# Patient Record
Sex: MALE | Race: WHITE | Employment: FULL TIME | URBAN - METROPOLITAN AREA
[De-identification: names, ages, dates, MRNs, and addresses within clinical notes are randomized per-mention and may not be internally consistent; named-entity substitution may affect disease eponyms.]

---

## 2019-02-14 ENCOUNTER — OFFICE VISIT (OUTPATIENT)
Dept: FAMILY MEDICINE CLINIC | Age: 49
End: 2019-02-14
Payer: MEDICAID

## 2019-02-14 VITALS
HEIGHT: 69 IN | DIASTOLIC BLOOD PRESSURE: 83 MMHG | BODY MASS INDEX: 29.27 KG/M2 | TEMPERATURE: 97 F | WEIGHT: 197.6 LBS | HEART RATE: 85 BPM | SYSTOLIC BLOOD PRESSURE: 136 MMHG

## 2019-02-14 DIAGNOSIS — G89.29 CHRONIC PAIN OF LEFT ANKLE: Primary | ICD-10-CM

## 2019-02-14 DIAGNOSIS — M25.572 CHRONIC PAIN OF LEFT ANKLE: Primary | ICD-10-CM

## 2019-02-14 DIAGNOSIS — Z00.00 HEALTHCARE MAINTENANCE: ICD-10-CM

## 2019-02-14 DIAGNOSIS — Z23 NEED FOR SHINGLES VACCINE: ICD-10-CM

## 2019-02-14 PROCEDURE — 99211 OFF/OP EST MAY X REQ PHY/QHP: CPT | Performed by: FAMILY MEDICINE

## 2019-02-14 PROCEDURE — G8419 CALC BMI OUT NRM PARAM NOF/U: HCPCS | Performed by: FAMILY MEDICINE

## 2019-02-14 PROCEDURE — 1036F TOBACCO NON-USER: CPT | Performed by: FAMILY MEDICINE

## 2019-02-14 PROCEDURE — 99213 OFFICE O/P EST LOW 20 MIN: CPT | Performed by: FAMILY MEDICINE

## 2019-02-14 PROCEDURE — G8427 DOCREV CUR MEDS BY ELIG CLIN: HCPCS | Performed by: FAMILY MEDICINE

## 2019-02-14 PROCEDURE — G8484 FLU IMMUNIZE NO ADMIN: HCPCS | Performed by: FAMILY MEDICINE

## 2019-02-14 ASSESSMENT — PATIENT HEALTH QUESTIONNAIRE - PHQ9
2. FEELING DOWN, DEPRESSED OR HOPELESS: 0
SUM OF ALL RESPONSES TO PHQ QUESTIONS 1-9: 0
SUM OF ALL RESPONSES TO PHQ QUESTIONS 1-9: 0
SUM OF ALL RESPONSES TO PHQ9 QUESTIONS 1 & 2: 0
1. LITTLE INTEREST OR PLEASURE IN DOING THINGS: 0

## 2019-03-08 ENCOUNTER — HOSPITAL ENCOUNTER (OUTPATIENT)
Age: 49
Discharge: HOME | End: 2019-03-08
Payer: MEDICAID

## 2019-03-08 ENCOUNTER — HOSPITAL ENCOUNTER (OUTPATIENT)
Dept: GENERAL RADIOLOGY | Age: 49
Discharge: HOME | End: 2019-03-10
Payer: MEDICAID

## 2019-03-08 ENCOUNTER — HOSPITAL ENCOUNTER (OUTPATIENT)
Age: 49
Discharge: HOME | End: 2019-03-10
Payer: MEDICAID

## 2019-03-08 DIAGNOSIS — Z00.00 HEALTHCARE MAINTENANCE: ICD-10-CM

## 2019-03-08 DIAGNOSIS — G89.29 CHRONIC PAIN OF LEFT ANKLE: ICD-10-CM

## 2019-03-08 DIAGNOSIS — M25.572 CHRONIC PAIN OF LEFT ANKLE: ICD-10-CM

## 2019-03-08 LAB
ANION GAP SERPL CALCULATED.3IONS-SCNC: 11 MMOL/L (ref 9–17)
BUN BLDV-MCNC: 14 MG/DL (ref 6–20)
BUN/CREAT BLD: ABNORMAL (ref 9–20)
CALCIUM SERPL-MCNC: 9.3 MG/DL (ref 8.6–10.4)
CHLORIDE BLD-SCNC: 105 MMOL/L (ref 98–107)
CHOLESTEROL/HDL RATIO: 4.6
CHOLESTEROL: 209 MG/DL
CO2: 26 MMOL/L (ref 20–31)
CREAT SERPL-MCNC: 0.79 MG/DL (ref 0.7–1.2)
GFR AFRICAN AMERICAN: >60 ML/MIN
GFR NON-AFRICAN AMERICAN: >60 ML/MIN
GFR SERPL CREATININE-BSD FRML MDRD: ABNORMAL ML/MIN/{1.73_M2}
GFR SERPL CREATININE-BSD FRML MDRD: ABNORMAL ML/MIN/{1.73_M2}
GLUCOSE BLD-MCNC: 120 MG/DL (ref 70–99)
HDLC SERPL-MCNC: 45 MG/DL
LDL CHOLESTEROL: 128 MG/DL (ref 0–130)
POTASSIUM SERPL-SCNC: 4.3 MMOL/L (ref 3.7–5.3)
SODIUM BLD-SCNC: 142 MMOL/L (ref 135–144)
TRIGL SERPL-MCNC: 182 MG/DL
VLDLC SERPL CALC-MCNC: ABNORMAL MG/DL (ref 1–30)

## 2019-03-08 PROCEDURE — 36415 COLL VENOUS BLD VENIPUNCTURE: CPT

## 2019-03-08 PROCEDURE — 80061 LIPID PANEL: CPT

## 2019-03-08 PROCEDURE — 80048 BASIC METABOLIC PNL TOTAL CA: CPT

## 2019-03-08 PROCEDURE — 73610 X-RAY EXAM OF ANKLE: CPT

## 2019-03-12 ENCOUNTER — OFFICE VISIT (OUTPATIENT)
Dept: FAMILY MEDICINE CLINIC | Age: 49
End: 2019-03-12
Payer: MEDICAID

## 2019-03-12 VITALS
DIASTOLIC BLOOD PRESSURE: 86 MMHG | HEART RATE: 79 BPM | HEIGHT: 69 IN | TEMPERATURE: 98.6 F | SYSTOLIC BLOOD PRESSURE: 138 MMHG | WEIGHT: 196 LBS | BODY MASS INDEX: 29.03 KG/M2

## 2019-03-12 DIAGNOSIS — R20.2 PARESTHESIAS: ICD-10-CM

## 2019-03-12 DIAGNOSIS — M19.079 ANKLE ARTHRITIS: Primary | ICD-10-CM

## 2019-03-12 DIAGNOSIS — R73.9 HYPERGLYCEMIA: ICD-10-CM

## 2019-03-12 PROCEDURE — G8419 CALC BMI OUT NRM PARAM NOF/U: HCPCS | Performed by: FAMILY MEDICINE

## 2019-03-12 PROCEDURE — 99213 OFFICE O/P EST LOW 20 MIN: CPT | Performed by: FAMILY MEDICINE

## 2019-03-12 PROCEDURE — 99211 OFF/OP EST MAY X REQ PHY/QHP: CPT | Performed by: FAMILY MEDICINE

## 2019-03-12 PROCEDURE — G8484 FLU IMMUNIZE NO ADMIN: HCPCS | Performed by: FAMILY MEDICINE

## 2019-03-12 PROCEDURE — G8427 DOCREV CUR MEDS BY ELIG CLIN: HCPCS | Performed by: FAMILY MEDICINE

## 2019-03-12 PROCEDURE — 1036F TOBACCO NON-USER: CPT | Performed by: FAMILY MEDICINE

## 2019-03-26 ENCOUNTER — TELEPHONE (OUTPATIENT)
Dept: FAMILY MEDICINE CLINIC | Age: 49
End: 2019-03-26

## 2019-03-27 ENCOUNTER — OFFICE VISIT (OUTPATIENT)
Dept: ORTHOPEDIC SURGERY | Age: 49
End: 2019-03-27
Payer: MEDICAID

## 2019-03-27 VITALS — HEIGHT: 69 IN | BODY MASS INDEX: 29.03 KG/M2 | WEIGHT: 195.99 LBS

## 2019-03-27 DIAGNOSIS — M19.072 ARTHRITIS OF LEFT ANKLE: Primary | ICD-10-CM

## 2019-03-27 PROCEDURE — 99243 OFF/OP CNSLTJ NEW/EST LOW 30: CPT | Performed by: ORTHOPAEDIC SURGERY

## 2019-03-27 PROCEDURE — G8419 CALC BMI OUT NRM PARAM NOF/U: HCPCS | Performed by: ORTHOPAEDIC SURGERY

## 2019-03-27 PROCEDURE — 3017F COLORECTAL CA SCREEN DOC REV: CPT | Performed by: ORTHOPAEDIC SURGERY

## 2019-03-27 PROCEDURE — G8484 FLU IMMUNIZE NO ADMIN: HCPCS | Performed by: ORTHOPAEDIC SURGERY

## 2019-03-27 PROCEDURE — G8427 DOCREV CUR MEDS BY ELIG CLIN: HCPCS | Performed by: ORTHOPAEDIC SURGERY

## 2019-03-27 ASSESSMENT — ENCOUNTER SYMPTOMS
WHEEZING: 0
BACK PAIN: 0
SHORTNESS OF BREATH: 0

## 2019-03-28 RX ORDER — MELOXICAM 15 MG/1
15 TABLET ORAL DAILY
Qty: 30 TABLET | Refills: 2 | Status: SHIPPED | OUTPATIENT
Start: 2019-03-28 | End: 2019-06-27

## 2019-04-01 DIAGNOSIS — M25.572 LEFT ANKLE PAIN, UNSPECIFIED CHRONICITY: Primary | ICD-10-CM

## 2019-04-02 ENCOUNTER — OFFICE VISIT (OUTPATIENT)
Dept: ORTHOPEDIC SURGERY | Age: 49
End: 2019-04-02
Payer: MEDICAID

## 2019-04-02 VITALS — WEIGHT: 195 LBS | BODY MASS INDEX: 28.88 KG/M2 | HEIGHT: 69 IN

## 2019-04-02 DIAGNOSIS — M19.172 POST-TRAUMATIC ARTHRITIS OF LEFT ANKLE: Primary | ICD-10-CM

## 2019-04-02 PROCEDURE — 99213 OFFICE O/P EST LOW 20 MIN: CPT | Performed by: ORTHOPAEDIC SURGERY

## 2019-04-02 PROCEDURE — 3017F COLORECTAL CA SCREEN DOC REV: CPT | Performed by: ORTHOPAEDIC SURGERY

## 2019-04-02 PROCEDURE — G8427 DOCREV CUR MEDS BY ELIG CLIN: HCPCS | Performed by: ORTHOPAEDIC SURGERY

## 2019-04-02 PROCEDURE — G8419 CALC BMI OUT NRM PARAM NOF/U: HCPCS | Performed by: ORTHOPAEDIC SURGERY

## 2019-04-02 PROCEDURE — 1036F TOBACCO NON-USER: CPT | Performed by: ORTHOPAEDIC SURGERY

## 2019-04-02 ASSESSMENT — ENCOUNTER SYMPTOMS
EYE PAIN: 0
SHORTNESS OF BREATH: 0
ABDOMINAL PAIN: 0

## 2019-04-02 NOTE — LETTER
Dr. Reynold Willett  [address] [place] [address] 6 [address]  [phone]        4/2/2019     Patient: Andree Villa  YOB: 1970    Dear Wesley Britt MD,    I had the pleasure of seeing one of your patients, Andree Villa recently in the office. Below are the relevant portions of my assessment and plan of care. ASSESSMENT AND PLAN:     He has left posttraumatic ankle arthritis, secondary to a tibial plafond fracture in 1996, status post bone grafting (no internal fixation used). Notably, he does not have any relevant past medical issues. I had a long discussion today with the patient about the likely diagnosis and its natural history, physical exam and imaging findings, as well as treatment options. We discussed both surgical and nonsurgical treatments, including risks and benefits. From a nonoperative standpoint, we discussed rest/activity modification, weight loss,  NSAIDs/Acetaminophen/topical anesthetics, alternative shoe wear/modification, orthotics, bracing/immobilization, physical therapy, injections, and possible future/advanced imaging. We also discussed intermittent ice (15-20 minutes every  minutes while awake), and elevation (about 3-4\" above the heart), to help with swelling and pain, as well as compression socks. From a surgical standpoint, we discussed tibiotalar arthrodesis as well as total ankle replacement, with possible ANANT versus gastroc, and soft tissue balancing as needed. We also discussed that they both provide roughly equivalent levels of pain relief as well as postoperative activity. Given his relatively young age, as well as the posttraumatic nature of his degeneration, I have recommended a tibiotalar arthrodesis, in the future whenever he fails nonoperative treatment. At this point, the patient wishes to pursue conservative management for this problem. The patient will proceed with the recommendations as above. Orders/referrals were placed as below at today's visit. I provided him a referral to obtain an ultrasound-guided injection of his left ankle. I also provided him compression socks (20-30 mmHg). I also provided a referral to obtain an [place] brace. All questions were answered and the patient agrees with the above plan. The patient will return to clinic in 6 weeks with standing bone length x-rays to evaluate his alignment. I look forward to serving you and your patients again in the future. Please don't hesitate to contact me at my mobile number [phone].         Travis Foote MD  Orthopedic Surgery, Foot and Ankle

## 2019-04-02 NOTE — PROGRESS NOTES
meloxicam (MOBIC) 15 MG tablet Take 1 tablet by mouth daily 30 tablet 2     No current facility-administered medications for this visit. Allergies:    Patient has no known allergies. Family History:  Family History   Problem Relation Age of Onset    Cancer Mother     Heart Disease Father     No Known Problems Sister     No Known Problems Brother     No Known Problems Maternal Aunt     No Known Problems Maternal Uncle     No Known Problems Maternal Grandmother     Heart Attack Maternal Grandfather     No Known Problems Paternal Grandmother     No Known Problems Paternal Grandfather        Social History:   Social History     Socioeconomic History    Marital status: Single     Spouse name: Not on file    Number of children: Not on file    Years of education: Not on file    Highest education level: Not on file   Occupational History    Not on file   Social Needs    Financial resource strain: Not on file    Food insecurity:     Worry: Not on file     Inability: Not on file    Transportation needs:     Medical: Not on file     Non-medical: Not on file   Tobacco Use    Smoking status: Never Smoker    Smokeless tobacco: Never Used   Substance and Sexual Activity    Alcohol use:  Yes     Alcohol/week: 3.6 oz     Types: 6 Cans of beer per week     Comment: 6 pack of beer a week    Drug use: Yes     Types: Marijuana    Sexual activity: Not on file   Lifestyle    Physical activity:     Days per week: Not on file     Minutes per session: Not on file    Stress: Not on file   Relationships    Social connections:     Talks on phone: Not on file     Gets together: Not on file     Attends Orthodoxy service: Not on file     Active member of club or organization: Not on file     Attends meetings of clubs or organizations: Not on file     Relationship status: Not on file    Intimate partner violence:     Fear of current or ex partner: Not on file     Emotionally abused: Not on file     Physically abused: Not on file     Forced sexual activity: Not on file   Other Topics Concern    Not on file   Social History Narrative    Not on file     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 Skin: Intact without rash/lesions/AV malformations. Healed incision over the anterior ankle with diminished sensation directly over this. Strength: Able to fire/perform the following with appropriate strength:    [x]  Tib Ant:     [x]  Gastroc-Soleus:         [x]  Inversion:    [x]  Eversion:         [x]  FHL:     [x]  EHL:      Motion:  Normal for the following joints:     []  Ankle: decreased   []  Subtalar:   Diminished bilateral but slightly less on the left than the contralateral side     [x]  1st MTP:        []  1st TMT:           Pain with extremes of ankle ROM    Tenderness to Palpation:    Tenderness to palpation:  Anterior ankle joint line  No tenderness to palpation:  Sinus tarsi    Muscle Contractures:       []  Gastroc Equinus       []  Achilles       []  PTT       []  Peroneus Longus     [x]  Unable to assess due to decreased ROM    Instability: WNL/negative tests below:      [x]  Varus stress laxity      [x]  Valgus stress laxity      UNAFFECTED EXTREMITY (right lower extremity):  Alignment:  Heel neutral  Vascular: Toes warm and well perfused, compartments soft/compressible, no swelling of ankle.   Skin: Intact without rash/lesions/AV malformations. Strength: Able to fire/perform the following with appropriate strength:    [x]  Tib Ant:     [x]  Gastroc-Soleus:         [x]  Inversion:    [x]  Eversion:         [x]  FHL:     [x]  EHL:      Motion:  Normal for the following joints:     [x]  Ankle:     []  Subtalar:    Diminished    [x]  1st MTP:        []  1st TMT:            Tenderness to Palpation:    No tenderness to palpation:  bony prominences or joints    Muscle Contractures:       []  Gastroc Equinus       []  Achilles       []  PTT       []  Peroneus Longus     []  Unable to assess due to decreased ROM    Instability: WNL/negative tests below:      [x]  Varus stress laxity      [x]  Valgus stress laxity        RADIOLOGY:   4/2/2019 FINDINGS:  Three weightbearing views (AP, Mortise, and Lateral) of the left ankle and three weightbearing views (AP, Oblique, Lateral) of the left foot were obtained in the office today and reviewed, revealing no acute fracture, dislocation, or radioopaque foreign body/tumor. The ankle mortise is maintained with no widening of the clear spaces. Overall alignment is satisfactory. Also noted:  osteophytes of the dorsal neck of the talus and anterior distal tibia and degenerative changes of Tibiotalar joint greater than talonavicular and subtalar joints. IMPRESSION:    No acute fracture/dislocation. Degenerative changes of Tibiotalar joint with osteophytes, as well as some degenerative changes of the subtalar joint and talonavicular joint. .         ASSESSMENT AND PLAN:  Kandi Levi was seen today for Ankle Injury (left)  The encounter diagnosis was Post-traumatic arthritis of left ankle. Body mass index is 28.8 kg/m². He has left posttraumatic ankle arthritis, secondary to a tibial plafond fracture in 1996, status post bone grafting (no internal fixation used). Notably, he does not have any relevant past medical issues.     I had a long discussion today with the patient about the likely diagnosis and its Eval and Treat     Referral Reason:   Specialty Services Required     Referred to Provider:   Ritika Rick, DO     Requested Specialty:   Internal Medicine Sports Medicine     Number of Visits Requested:   1    DME Order for (Specify) as OP     DME: compression stockings (20-30 mm Hg)  Routine, External, Referral By - Edgar Langley Mosesy-1, Supply Name: Compression Stockings Prognosis: good Estimated length of need: 80    DME Order for Orthosis as OP     Eval and treat. Please provide custom [place] brace. Tracie Garcia MD  Orthopedic Surgery, Foot and Ankle         Tracie Garcia MD  Orthopedic Surgery, Foot and Ankle        Please excuse any typos/errors, as this note was created with the assistance of voice recognition software. While intending to generate a document that actually reflects the content of the visit, the document can still have some errors including those of syntax and sound-a-like substitutions which may escape proof reading. In such instances, actual meaning can be extrapolated by context.

## 2019-04-02 NOTE — PROGRESS NOTES
[address] [facility] [address] [place] [address]  Dept: [phone]  Review of Systems    Name: Yevgeniy Martinez   : 1970    Date: 2019     Review of Systems   Constitutional: Negative for fever. HENT: Negative for tinnitus. Eyes: Negative for pain. Respiratory: Negative for shortness of breath. Cardiovascular: Negative for chest pain. Gastrointestinal: Negative for abdominal pain. Genitourinary: Negative for dysuria. Skin: Negative for rash. Neurological: Negative for headaches. Hematological: Does not bruise/bleed easily.      Musculoskeletal: See HPI for pertinent positives

## 2019-04-09 ENCOUNTER — OFFICE VISIT (OUTPATIENT)
Dept: ORTHOPEDIC SURGERY | Age: 49
End: 2019-04-09
Payer: MEDICAID

## 2019-04-09 VITALS
RESPIRATION RATE: 20 BRPM | HEART RATE: 71 BPM | SYSTOLIC BLOOD PRESSURE: 128 MMHG | BODY MASS INDEX: 29.47 KG/M2 | HEIGHT: 69 IN | WEIGHT: 199 LBS | DIASTOLIC BLOOD PRESSURE: 83 MMHG

## 2019-04-09 DIAGNOSIS — M19.172 POST-TRAUMATIC ARTHRITIS OF LEFT ANKLE: Primary | ICD-10-CM

## 2019-04-09 PROCEDURE — S0020 INJECTION, BUPIVICAINE HYDRO: HCPCS | Performed by: FAMILY MEDICINE

## 2019-04-09 PROCEDURE — G8419 CALC BMI OUT NRM PARAM NOF/U: HCPCS | Performed by: FAMILY MEDICINE

## 2019-04-09 PROCEDURE — 20606 DRAIN/INJ JOINT/BURSA W/US: CPT | Performed by: FAMILY MEDICINE

## 2019-04-09 PROCEDURE — 3017F COLORECTAL CA SCREEN DOC REV: CPT | Performed by: FAMILY MEDICINE

## 2019-04-09 PROCEDURE — 99203 OFFICE O/P NEW LOW 30 MIN: CPT | Performed by: FAMILY MEDICINE

## 2019-04-09 PROCEDURE — G8427 DOCREV CUR MEDS BY ELIG CLIN: HCPCS | Performed by: FAMILY MEDICINE

## 2019-04-09 PROCEDURE — 1036F TOBACCO NON-USER: CPT | Performed by: FAMILY MEDICINE

## 2019-04-09 RX ORDER — TRIAMCINOLONE ACETONIDE 40 MG/ML
40 INJECTION, SUSPENSION INTRA-ARTICULAR; INTRAMUSCULAR ONCE
Status: COMPLETED | OUTPATIENT
Start: 2019-04-09 | End: 2019-04-09

## 2019-04-09 RX ORDER — BUPIVACAINE HYDROCHLORIDE 5 MG/ML
1 INJECTION, SOLUTION PERINEURAL ONCE
Status: COMPLETED | OUTPATIENT
Start: 2019-04-09 | End: 2019-04-09

## 2019-04-09 RX ADMIN — BUPIVACAINE HYDROCHLORIDE 5 MG: 5 INJECTION, SOLUTION PERINEURAL at 14:41

## 2019-04-09 RX ADMIN — TRIAMCINOLONE ACETONIDE 40 MG: 40 INJECTION, SUSPENSION INTRA-ARTICULAR; INTRAMUSCULAR at 14:40

## 2019-04-09 NOTE — PROGRESS NOTES
Sports Medicine Consultation      CHIEF COMPLAINT:  New Patient and Ankle Pain (Post traumatic left ankle pain )  . HPI:   The patient is a 47 y.o. male who is being seen in  new patient being seen for regarding new problem  left foot/ankle pain. The patient states the pain has been present for  years. As far as trauma to the area, the patient indicates accident playing hockey. There is  pain with weight bearing. The patient states numbness and tingling is not present. Catching and locking has not been present. He has tried nothing without relief. he has a past medical history of Allergic rhinitis. he has a past surgical history that includes Anterior cruciate ligament repair and orif femur decompression. family history includes Cancer in his mother; Heart Attack in his maternal grandfather; Heart Disease in his father; No Known Problems in his brother, maternal aunt, maternal grandmother, maternal uncle, paternal grandfather, paternal grandmother, and sister. Social History     Socioeconomic History    Marital status: Single     Spouse name: Not on file    Number of children: Not on file    Years of education: Not on file    Highest education level: Not on file   Occupational History    Not on file   Social Needs    Financial resource strain: Not on file    Food insecurity:     Worry: Not on file     Inability: Not on file    Transportation needs:     Medical: Not on file     Non-medical: Not on file   Tobacco Use    Smoking status: Never Smoker    Smokeless tobacco: Never Used   Substance and Sexual Activity    Alcohol use:  Yes     Alcohol/week: 3.6 oz     Types: 6 Cans of beer per week     Comment: 6 pack of beer a week    Drug use: Yes     Types: Marijuana    Sexual activity: Not on file   Lifestyle    Physical activity:     Days per week: Not on file     Minutes per session: Not on file    Stress: Not on file   Relationships    Social connections:     Talks on phone: Not on file     Gets together: Not on file     Attends Mormon service: Not on file     Active member of club or organization: Not on file     Attends meetings of clubs or organizations: Not on file     Relationship status: Not on file    Intimate partner violence:     Fear of current or ex partner: Not on file     Emotionally abused: Not on file     Physically abused: Not on file     Forced sexual activity: Not on file   Other Topics Concern    Not on file   Social History Narrative    Not on file       Current Outpatient Medications   Medication Sig Dispense Refill    meloxicam (MOBIC) 15 MG tablet Take 1 tablet by mouth daily 30 tablet 2     No current facility-administered medications for this visit. Allergies:  hehas No Known Allergies. ROS:  CV:  Denies chest pain; palpitations; shortness of breath; swelling of feet, ankles; and loss of consciousness. CON: Denies fever and dizziness. ENT:  Denies hearing loss / ringing, ear infections hoarseness, and swallowing problems. RESP:  Denies chronic cough, spitting up blood, and asthma/wheezing. GI: Denies abdominal pain, change in bowel habits, nausea or vomiting, and blood in stools. :  Denies frequent urination, burning or painful urination, blood in the urine, and bladder incontinence. NEURO:  Denies headache, memory loss, sleep disturbance, and tremor or movement disorder. 11 system review of systems is otherwise negative unless noted in HPI    PHYSICAL EXAM:   /83   Pulse 71   Resp 20   Ht 5' 9\" (1.753 m)   Wt 199 lb (90.3 kg)   BMI 29.39 kg/m²   GENERAL: Neal Samano is a 47 y.o. male who is alert and oriented and sitting comfortably in our office. SKIN:  Intact without rashes, lesions or ulcerations. No obvious deformity or swelling. NEURO: Musculoskeletal and axillary nerves intact to sensory and motor testing. EYES:  Extraocular muscles intact. MOUTH: Oral mucosa moist.  No perioral lesions.   PULM: TOLERATED PROCEDURE WELL HE'LL FOLLOW-UP WITH ME OTHERWISE AS NEEDED WE MAY GIVE HIM AN INJECTION OVER 3-4 MONTHS UNTIL FOLLOW-UP AS PREVIOUSLY DESCRIBED WITH DR. PAUL    No follow-ups on file. Please be aware portions of this note were completed using voice recognition software and unforeseen errors may have occurred    Electronically signed by John Wilson DO, FAOASM  on 4/9/19 at 12:18 PM    No orders of the defined types were placed in this encounter.

## 2019-04-30 ENCOUNTER — OFFICE VISIT (OUTPATIENT)
Dept: FAMILY MEDICINE CLINIC | Age: 49
End: 2019-04-30
Payer: MEDICAID

## 2019-04-30 VITALS
TEMPERATURE: 99.3 F | WEIGHT: 195.6 LBS | HEART RATE: 88 BPM | DIASTOLIC BLOOD PRESSURE: 83 MMHG | BODY MASS INDEX: 28.97 KG/M2 | SYSTOLIC BLOOD PRESSURE: 139 MMHG | HEIGHT: 69 IN

## 2019-04-30 DIAGNOSIS — L91.8 CUTANEOUS SKIN TAGS: Primary | ICD-10-CM

## 2019-04-30 PROCEDURE — 1036F TOBACCO NON-USER: CPT | Performed by: FAMILY MEDICINE

## 2019-04-30 PROCEDURE — G8427 DOCREV CUR MEDS BY ELIG CLIN: HCPCS | Performed by: FAMILY MEDICINE

## 2019-04-30 PROCEDURE — 11200 RMVL SKIN TAGS UP TO&INC 15: CPT | Performed by: FAMILY MEDICINE

## 2019-04-30 PROCEDURE — G8419 CALC BMI OUT NRM PARAM NOF/U: HCPCS | Performed by: FAMILY MEDICINE

## 2019-04-30 PROCEDURE — 99212 OFFICE O/P EST SF 10 MIN: CPT | Performed by: FAMILY MEDICINE

## 2019-04-30 PROCEDURE — 3017F COLORECTAL CA SCREEN DOC REV: CPT | Performed by: FAMILY MEDICINE

## 2019-04-30 NOTE — PROGRESS NOTES
Visit Information    Have you changed or started any medications since your last visit including any over-the-counter medicines, vitamins, or herbal medicines? no   Have you stopped taking any of your medications? Is so, why? -  no  Are you having any side effects from any of your medications? - no    Have you seen any other physician or provider since your last visit?  no   Have you had any other diagnostic tests since your last visit?  no   Have you been seen in the emergency room and/or had an admission in a hospital since we last saw you?  no   Have you had your routine dental cleaning in the past 6 months?  no     Do you have an active MyChart account? If no, what is the barrier?   No:     Patient Care Team:  Tirso Carlos MD as PCP - General (Family Medicine)    Medical History Review  Past Medical, Family, and Social History reviewed and does contribute to the patient presenting condition    Health Maintenance   Topic Date Due    HIV screen  03/25/1984    DTaP/Tdap/Td vaccine (1 - Tdap) 03/25/1988    Diabetes screen  03/25/2009    Shingles Vaccine (1 of 2) 03/25/2019    Colon cancer screen colonoscopy  03/25/2019    Flu vaccine (Season Ended) 09/01/2019    Lipid screen  03/08/2024    Pneumococcal 0-64 years Vaccine  Aged Out

## 2019-05-01 NOTE — PROGRESS NOTES
Here for removal of three skin tags. One in each axillary area and one back of neck. They stay irritated and catch on clothes or neck chain. Past Medical History:   Diagnosis Date    Allergic rhinitis    NKDA    SKIN - salmon colored, mildly inflamed skin tags - bilateral axiallry area and posterior neck. Areas cleansed with betadine, briefly anesthetized with freezing spray, and each tag easily removed with sterile blade. Minimal oozing easily controlled with gentle gauze pressure and sterile band aid applied. Consent form was signed and time out performed. A/P  skin tag removal.    Follow up  two months.  arthritis

## 2019-06-27 ENCOUNTER — OFFICE VISIT (OUTPATIENT)
Dept: FAMILY MEDICINE CLINIC | Age: 49
End: 2019-06-27
Payer: MEDICAID

## 2019-06-27 VITALS
WEIGHT: 187.4 LBS | HEART RATE: 81 BPM | BODY MASS INDEX: 27.76 KG/M2 | SYSTOLIC BLOOD PRESSURE: 130 MMHG | TEMPERATURE: 97.9 F | HEIGHT: 69 IN | DIASTOLIC BLOOD PRESSURE: 86 MMHG

## 2019-06-27 DIAGNOSIS — B35.1 ONYCHOMYCOSIS: ICD-10-CM

## 2019-06-27 DIAGNOSIS — Z51.81 MEDICATION MONITORING ENCOUNTER: ICD-10-CM

## 2019-06-27 DIAGNOSIS — M25.531 RIGHT WRIST PAIN: Primary | ICD-10-CM

## 2019-06-27 DIAGNOSIS — Z00.00 HEALTHCARE MAINTENANCE: ICD-10-CM

## 2019-06-27 PROCEDURE — G8427 DOCREV CUR MEDS BY ELIG CLIN: HCPCS | Performed by: FAMILY MEDICINE

## 2019-06-27 PROCEDURE — 1036F TOBACCO NON-USER: CPT | Performed by: FAMILY MEDICINE

## 2019-06-27 PROCEDURE — 99211 OFF/OP EST MAY X REQ PHY/QHP: CPT | Performed by: FAMILY MEDICINE

## 2019-06-27 PROCEDURE — 3017F COLORECTAL CA SCREEN DOC REV: CPT | Performed by: FAMILY MEDICINE

## 2019-06-27 PROCEDURE — G8419 CALC BMI OUT NRM PARAM NOF/U: HCPCS | Performed by: FAMILY MEDICINE

## 2019-06-27 PROCEDURE — 99213 OFFICE O/P EST LOW 20 MIN: CPT | Performed by: FAMILY MEDICINE

## 2019-06-27 RX ORDER — TERBINAFINE HYDROCHLORIDE 250 MG/1
250 TABLET ORAL DAILY
Qty: 84 TABLET | Refills: 0 | Status: SHIPPED | OUTPATIENT
Start: 2019-06-27 | End: 2019-09-19

## 2019-06-27 NOTE — PATIENT INSTRUCTIONS
Lillian Guzman,    1. Labs: HIV,liver tests, diabetic tests. 2. Wrist xray  3. Start your toe nail medicine  4.  Cologuard will arrive in the mail.    : )   Dr. Juaquin Padilla

## 2019-06-27 NOTE — PROGRESS NOTES
Visit Information    Have you changed or started any medications since your last visit including any over-the-counter medicines, vitamins, or herbal medicines? no   Have you stopped taking any of your medications? Is so, why? -  no  Are you having any side effects from any of your medications? - no    Have you seen any other physician or provider since your last visit?  no   Have you had any other diagnostic tests since your last visit?  no   Have you been seen in the emergency room and/or had an admission in a hospital since we last saw you?  no   Have you had your routine dental cleaning in the past 6 months?  no     Do you have an active MyChart account? If no, what is the barrier?   No:     Patient Care Team:  Иван Sánchez MD as PCP - General (Family Medicine)  Иван Sánchez MD as PCP - [facility]    Medical History Review  Past Medical, Family, and Social History reviewed and does contribute to the patient presenting condition    Health Maintenance   Topic Date Due    HIV screen  03/25/1984    DTaP/Tdap/Td vaccine (1 - Tdap) 03/25/1988    Diabetes screen  03/25/2009    Shingles Vaccine (1 of 2) 03/25/2019    Colon cancer screen colonoscopy  03/25/2019    Flu vaccine (Season Ended) 09/01/2019    Lipid screen  03/08/2024    Pneumococcal 0-64 years Vaccine  Aged Out

## 2019-06-28 NOTE — PROGRESS NOTES
Right wrist pain for years. No H/O trauma. 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    Denies red, warm or swollen wrist    C/O yellow, deformed nails  . Vitals:    06/27/19 1107   BP: 130/86   Pulse:    Temp:        Vitals reviewed. Lungs clear in all fields. CV- RRR with normal heart sounds. Right wrist with no edema or erythema. FROM. Yellow, thickened great and second toenails. Diagnosis Orders   1. Right wrist pain  XR WRIST LEFT (2 VIEWS)   2. Medication monitoring encounter  Hepatic Function Panel   3. Onychomycosis  Hepatic Function Panel. Start lamisil for 12 weeks. Appropriate use of medication, possible side effects , expected benefits. Pt expressed understanding. 4. Healthcare maintenance  HIV Screen     Follow up two months.

## 2019-07-22 ENCOUNTER — HOSPITAL ENCOUNTER (OUTPATIENT)
Dept: GENERAL RADIOLOGY | Age: 49
Discharge: HOME | End: 2019-07-24
Payer: MEDICAID

## 2019-07-22 ENCOUNTER — HOSPITAL ENCOUNTER (OUTPATIENT)
Age: 49
Discharge: HOME | End: 2019-07-22
Payer: MEDICAID

## 2019-07-22 ENCOUNTER — HOSPITAL ENCOUNTER (OUTPATIENT)
Age: 49
Discharge: HOME | End: 2019-07-24
Payer: MEDICAID

## 2019-07-22 DIAGNOSIS — Z51.81 MEDICATION MONITORING ENCOUNTER: ICD-10-CM

## 2019-07-22 DIAGNOSIS — M25.531 RIGHT WRIST PAIN: ICD-10-CM

## 2019-07-22 DIAGNOSIS — R73.9 HYPERGLYCEMIA: ICD-10-CM

## 2019-07-22 DIAGNOSIS — B35.1 ONYCHOMYCOSIS: ICD-10-CM

## 2019-07-22 DIAGNOSIS — Z00.00 HEALTHCARE MAINTENANCE: ICD-10-CM

## 2019-07-22 LAB
ALBUMIN SERPL-MCNC: 4.8 G/DL (ref 3.5–5.2)
ALBUMIN/GLOBULIN RATIO: 1.6 (ref 1–2.5)
ALP BLD-CCNC: 68 U/L (ref 40–129)
ALT SERPL-CCNC: 33 U/L (ref 5–41)
AST SERPL-CCNC: 26 U/L
BILIRUB SERPL-MCNC: 0.85 MG/DL (ref 0.3–1.2)
BILIRUBIN DIRECT: 0.19 MG/DL
BILIRUBIN, INDIRECT: 0.66 MG/DL (ref 0–1)
GLOBULIN: NORMAL G/DL (ref 1.5–3.8)
HIV AG/AB: NONREACTIVE
TOTAL PROTEIN: 7.8 G/DL (ref 6.4–8.3)

## 2019-07-22 PROCEDURE — 80076 HEPATIC FUNCTION PANEL: CPT

## 2019-07-22 PROCEDURE — 87389 HIV-1 AG W/HIV-1&-2 AB AG IA: CPT

## 2019-07-22 PROCEDURE — 36415 COLL VENOUS BLD VENIPUNCTURE: CPT

## 2019-07-22 PROCEDURE — 73110 X-RAY EXAM OF WRIST: CPT

## 2019-07-22 PROCEDURE — 83036 HEMOGLOBIN GLYCOSYLATED A1C: CPT

## 2019-07-24 LAB
ESTIMATED AVERAGE GLUCOSE: 114 MG/DL
HBA1C MFR BLD: 5.6 %

## 2019-08-01 ENCOUNTER — OFFICE VISIT (OUTPATIENT)
Dept: FAMILY MEDICINE CLINIC | Age: 49
End: 2019-08-01
Payer: MEDICAID

## 2019-08-01 VITALS
DIASTOLIC BLOOD PRESSURE: 94 MMHG | BODY MASS INDEX: 28.35 KG/M2 | HEIGHT: 69 IN | WEIGHT: 191.4 LBS | SYSTOLIC BLOOD PRESSURE: 147 MMHG | HEART RATE: 77 BPM

## 2019-08-01 DIAGNOSIS — M19.172 POST-TRAUMATIC ARTHRITIS OF ANKLE, LEFT: Primary | ICD-10-CM

## 2019-08-01 PROCEDURE — 3017F COLORECTAL CA SCREEN DOC REV: CPT | Performed by: STUDENT IN AN ORGANIZED HEALTH CARE EDUCATION/TRAINING PROGRAM

## 2019-08-01 PROCEDURE — G8427 DOCREV CUR MEDS BY ELIG CLIN: HCPCS | Performed by: STUDENT IN AN ORGANIZED HEALTH CARE EDUCATION/TRAINING PROGRAM

## 2019-08-01 PROCEDURE — 99213 OFFICE O/P EST LOW 20 MIN: CPT | Performed by: STUDENT IN AN ORGANIZED HEALTH CARE EDUCATION/TRAINING PROGRAM

## 2019-08-01 PROCEDURE — 20605 DRAIN/INJ JOINT/BURSA W/O US: CPT | Performed by: STUDENT IN AN ORGANIZED HEALTH CARE EDUCATION/TRAINING PROGRAM

## 2019-08-01 PROCEDURE — G8419 CALC BMI OUT NRM PARAM NOF/U: HCPCS | Performed by: STUDENT IN AN ORGANIZED HEALTH CARE EDUCATION/TRAINING PROGRAM

## 2019-08-01 PROCEDURE — 20605 DRAIN/INJ JOINT/BURSA W/O US: CPT | Performed by: FAMILY MEDICINE

## 2019-08-01 PROCEDURE — 1036F TOBACCO NON-USER: CPT | Performed by: STUDENT IN AN ORGANIZED HEALTH CARE EDUCATION/TRAINING PROGRAM

## 2019-08-01 PROCEDURE — 6360000002 HC RX W HCPCS

## 2019-08-01 RX ORDER — BUPIVACAINE HYDROCHLORIDE 5 MG/ML
1 INJECTION, SOLUTION EPIDURAL; INTRACAUDAL ONCE
Status: COMPLETED | OUTPATIENT
Start: 2019-08-01 | End: 2019-08-01

## 2019-08-01 RX ORDER — TRIAMCINOLONE ACETONIDE 40 MG/ML
40 INJECTION, SUSPENSION INTRA-ARTICULAR; INTRAMUSCULAR ONCE
Status: COMPLETED | OUTPATIENT
Start: 2019-08-01 | End: 2019-08-01

## 2019-08-01 RX ADMIN — BUPIVACAINE HYDROCHLORIDE 5 MG: 5 INJECTION, SOLUTION EPIDURAL; INTRACAUDAL at 15:39

## 2019-08-01 RX ADMIN — TRIAMCINOLONE ACETONIDE 40 MG: 40 INJECTION, SUSPENSION INTRA-ARTICULAR; INTRAMUSCULAR at 15:41

## 2019-08-01 ASSESSMENT — PAIN SCALES - GENERAL: PAINLEVEL_OUTOF10: 5

## 2019-08-01 NOTE — PROGRESS NOTES
Sports Medicine Consultation      CHIEF COMPLAINT:  Ankle Pain (left ankle pain, wants another injections, states he had sx improvement w last injection)  . HPI:   The patient is a 47 y.o. male who is being seen in   established patient being seen for regarding follow up of a pre-existing problem  left foot/ankle pain. The patient states the pain has been present for 25 years. As far as trauma to the area, the patient indicates injury to the ankle during a hockey game when he was 24. There is not pain with weight bearing. The patient states numbness and tingling is not present. Catching and locking has not been present. He has tried interarticular cortisone injection with relief. Injured hockey 25 years ago, lives with chronic pain takes no medication for it. First injection in April, 'game changer', lasts around 3-4 months. Started to wear off week and a half ago, movement dec swelling coming back. Movement less, ROM dec. Points to pain near anterior aspect of forefoot, localized to a certain area, 9/29 , localized. Hiking few weeks back in [place], painful next day just took aspirin. Never taken any opioids. Never used cane, almost got it before he got his first shot. Knee reconstruction 22 years ago, injured at 24.  ankle injured at 31, reconstruction for both. Notices limp on walking,  End of day in heavy boots knees get weak. Lost almost 15 lbs naturally. he has a past medical history of Allergic rhinitis. he has a past surgical history that includes Anterior cruciate ligament repair and orif femur decompression. family history includes Cancer in his mother; Heart Attack in his maternal grandfather; Heart Disease in his father; No Known Problems in his brother, maternal aunt, maternal grandmother, maternal uncle, paternal grandfather, paternal grandmother, and sister.     Social History     Socioeconomic History    Marital status: Single     Spouse name: Not on file    Number of children: Not on file    Years of education: Not on file    Highest education level: Not on file   Occupational History    Not on file   Social Needs    Financial resource strain: Not on file    Food insecurity:     Worry: Not on file     Inability: Not on file    Transportation needs:     Medical: Not on file     Non-medical: Not on file   Tobacco Use    Smoking status: Never Smoker    Smokeless tobacco: Never Used   Substance and Sexual Activity    Alcohol use: Yes     Alcohol/week: 6.0 standard drinks     Types: 6 Cans of beer per week     Comment: 6 pack of beer a week    Drug use: Yes     Types: Marijuana    Sexual activity: Not on file   Lifestyle    Physical activity:     Days per week: Not on file     Minutes per session: Not on file    Stress: Not on file   Relationships    Social connections:     Talks on phone: Not on file     Gets together: Not on file     Attends Jainism service: Not on file     Active member of club or organization: Not on file     Attends meetings of clubs or organizations: Not on file     Relationship status: Not on file    Intimate partner violence:     Fear of current or ex partner: Not on file     Emotionally abused: Not on file     Physically abused: Not on file     Forced sexual activity: Not on file   Other Topics Concern    Not on file   Social History Narrative    Not on file       Current Outpatient Medications   Medication Sig Dispense Refill    terbinafine (LAMISIL) 250 MG tablet Take 1 tablet by mouth daily 84 tablet 0     No current facility-administered medications for this visit. Allergies:  hehas No Known Allergies. ROS:  CV:  Denies chest pain; palpitations; shortness of breath; swelling of feet, ankles; and loss of consciousness. CON: Denies fever and dizziness. ENT:  Denies hearing loss / ringing, ear infections hoarseness, and swallowing problems.   RESP: E01.337     We discussed the various treatment alternatives including anti-inflammatory medications, physical therapy, injections, further imaging studies and as a last resort surgery.   -Intraarticular cortisone injection during this visit  -F/u 3 months PRN     Return in about 3 months (around 11/1/2019), or if symptoms worsen or fail to improve. Please be aware portions of this note were completed using voice recognition software and unforeseen errors may have occurred    Electronically signed by Memo Ribeiro DO, FAOASM  on 8/1/19 at 3:05 PM    No orders of the defined types were placed in this encounter.

## 2019-08-20 ENCOUNTER — OFFICE VISIT (OUTPATIENT)
Dept: FAMILY MEDICINE CLINIC | Age: 49
End: 2019-08-20
Payer: MEDICAID

## 2019-08-20 VITALS
BODY MASS INDEX: 28.14 KG/M2 | HEART RATE: 83 BPM | HEIGHT: 69 IN | TEMPERATURE: 97.9 F | DIASTOLIC BLOOD PRESSURE: 83 MMHG | SYSTOLIC BLOOD PRESSURE: 136 MMHG | WEIGHT: 190 LBS

## 2019-08-20 DIAGNOSIS — L98.9 SKIN LESION OF RIGHT ARM: ICD-10-CM

## 2019-08-20 DIAGNOSIS — M25.531 RIGHT WRIST PAIN: Primary | ICD-10-CM

## 2019-08-20 DIAGNOSIS — F41.1 ANXIETY, GENERALIZED: ICD-10-CM

## 2019-08-20 PROCEDURE — G8419 CALC BMI OUT NRM PARAM NOF/U: HCPCS | Performed by: FAMILY MEDICINE

## 2019-08-20 PROCEDURE — 3017F COLORECTAL CA SCREEN DOC REV: CPT | Performed by: FAMILY MEDICINE

## 2019-08-20 PROCEDURE — 1036F TOBACCO NON-USER: CPT | Performed by: FAMILY MEDICINE

## 2019-08-20 PROCEDURE — 99213 OFFICE O/P EST LOW 20 MIN: CPT | Performed by: FAMILY MEDICINE

## 2019-08-20 PROCEDURE — G8427 DOCREV CUR MEDS BY ELIG CLIN: HCPCS | Performed by: FAMILY MEDICINE

## 2019-08-20 RX ORDER — ESCITALOPRAM OXALATE 10 MG/1
10 TABLET ORAL DAILY
Qty: 30 TABLET | Refills: 3 | Status: SHIPPED | OUTPATIENT
Start: 2019-08-20 | End: 2019-11-27 | Stop reason: ALTCHOICE

## 2019-08-20 NOTE — PROGRESS NOTES
Visit Information    Have you changed or started any medications since your last visit including any over-the-counter medicines, vitamins, or herbal medicines? no   Have you stopped taking any of your medications? Is so, why? -  no  Are you having any side effects from any of your medications? - no    Have you seen any other physician or provider since your last visit?  no   Have you had any other diagnostic tests since your last visit? yes - xray and labs   Have you been seen in the emergency room and/or had an admission in a hospital since we last saw you?  no   Have you had your routine dental cleaning in the past 6 months?  no     Do you have an active MyChart account? If no, what is the barrier?   No:     Patient Care Team:  Jean Carlos Wyatt MD as PCP - General (Family Medicine)  Jean Carlos Wyatt MD as PCP - [facility]    Medical History Review  Past Medical, Family, and Social History reviewed and does contribute to the patient presenting condition    Health Maintenance   Topic Date Due    DTaP/Tdap/Td vaccine (1 - Tdap) 03/25/1988    Shingles Vaccine (1 of 2) 03/25/2019    Colon cancer screen colonoscopy  03/25/2019    Flu vaccine (1) 09/01/2019    Diabetes screen  07/22/2022    Lipid screen  03/08/2024    HIV screen  Completed    Pneumococcal 0-64 years Vaccine  Aged Out

## 2019-08-20 NOTE — PATIENT INSTRUCTIONS
rate, muscle stiffness, twitching, loss of coordination, nausea, vomiting, or diarrhea. Do not give this medicine to anyone under 12 years. What is escitalopram?  Escitalopram is an antidepressant in a group of drugs called selective serotonin reuptake inhibitors (SSRIs). Escitalopram affects chemicals in the brain that may be unbalanced in people with depression or anxiety. Escitalopram is used to treat anxiety in adults. Escitalopram is also used to treat major depressive disorder in adults and adolescents who are at least 15years old. Escitalopram may also be used for purposes not listed in this medication guide. What should I discuss with my healthcare provider before taking escitalopram?  You should not use this medicine if you are allergic to escitalopram or citalopram (Celexa), or if:  · you also take pimozide or citalopram.  Do not use escitalopram within 14 days before or 14 days after you have used an MAO inhibitor. A dangerous drug interaction could occur. MAO inhibitors include isocarboxazid, linezolid, phenelzine, rasagiline, selegiline, and tranylcypromine. Some medicines can interact with escitalopram and cause a serious condition called serotonin syndrome. Be sure your doctor knows if you also take stimulant medicine, opioid medicine, herbal products, or medicine for depression, mental illness, Parkinson's disease, migraine headaches, serious infections, or prevention of nausea and vomiting. Ask your doctor before making any changes in how or when you take your medications. To make sure escitalopram is safe for you, tell your doctor if you have ever had:  · liver or kidney disease;  · seizures;  · low levels of sodium in your blood;  · heart disease, high blood pressure;  · a stroke;  · a bleeding or blood clotting disorder;  · bipolar disorder (manic depression); or  · drug addiction or suicidal thoughts. Some young people have thoughts about suicide when first taking an antidepressant. (insomnia);  · dry mouth, loss of appetite;  · nausea, constipation;  · yawning;  · weight changes; or  · decreased sex drive, impotence, or difficulty having an orgasm. This is not a complete list of side effects and others may occur. Call your doctor for medical advice about side effects. You may report side effects to FDA at 0-825-FDA-2165. What other drugs will affect escitalopram?  Taking escitalopram with other drugs that make you sleepy can worsen this effect. Ask your doctor before taking a sleeping pill, narcotic medication, muscle relaxer, or medicine for anxiety, depression, or seizures. Tell your doctor about all medicines you use, and those you start or stop using during your treatment with escitalopram, especially:  · any other antidepressant;  · medicine to treat anxiety, mood disorders, or mental illness;  · lithium, Afton's wort, tramadol, or tryptophan (sometimes called L-tryptophan);  · a blood thinner --warfarin, Coumadin, Jantoven;  · migraine headache medication --sumatriptan, rizatriptan, and others;  · narcotic pain medication --fentanyl or tramadol; or  · stimulants or ADHD medication --Adderall, Concerta, Ritalin, and others; This list is not complete. Other drugs may interact with escitalopram, including prescription and over-the-counter medicines, vitamins, and herbal products. Not all possible interactions are listed in this medication guide. Where can I get more information? Your pharmacist can provide more information about escitalopram.  Remember, keep this and all other medicines out of the reach of children, never share your medicines with others, and use this medication only for the indication prescribed. Every effort has been made to ensure that the information provided by [facility] [address] [address] is accurate, up-to-date, and complete, but no guarantee is made to that effect. Drug information contained herein may be time sensitive.  [employer] information has been compiled for use by healthcare practitioners and consumers in the [place] and therefore [employer] does not warrant that uses outside of the [place] are appropriate, unless specifically indicated otherwise. [facility]'s drug information does not endorse drugs, diagnose patients or recommend therapy. [facility]'s drug information is an informational resource designed to assist licensed healthcare practitioners in caring for their patients and/or to serve consumers viewing this service as a supplement to, and not a substitute for, the expertise, skill, knowledge and judgment of healthcare practitioners. The absence of a warning for a given drug or drug combination in no way should be construed to indicate that the drug or drug combination is safe, effective or appropriate for any given patient. [facility] does not assume any responsibility for any aspect of healthcare administered with the aid of information [facility] provides. The information contained herein is not intended to cover all possible uses, directions, precautions, warnings, drug interactions, allergic reactions, or adverse effects. If you have questions about the drugs you are taking, check with your doctor, nurse or pharmacist.  Copyright 4415-1055 [address]. Version: 16.01. Revision date: 7/19/2017. Care instructions adapted under license by [facility] ([facility]). If you have questions about a medical condition or this instruction, always ask your healthcare professional. [place] [address] any warranty or liability for your use of this information.

## 2019-08-28 ENCOUNTER — INITIAL CONSULT (OUTPATIENT)
Dept: SURGERY | Age: 49
End: 2019-08-28
Payer: MEDICAID

## 2019-08-28 VITALS
HEIGHT: 69 IN | DIASTOLIC BLOOD PRESSURE: 91 MMHG | TEMPERATURE: 98 F | SYSTOLIC BLOOD PRESSURE: 135 MMHG | WEIGHT: 193.8 LBS | BODY MASS INDEX: 28.71 KG/M2 | HEART RATE: 92 BPM

## 2019-08-28 DIAGNOSIS — L98.9 SKIN LESION OF RIGHT UPPER EXTREMITY: Primary | ICD-10-CM

## 2019-08-28 DIAGNOSIS — L98.9 SKIN LESION OF LEFT UPPER EXTREMITY: ICD-10-CM

## 2019-08-28 PROCEDURE — G8419 CALC BMI OUT NRM PARAM NOF/U: HCPCS | Performed by: SURGERY

## 2019-08-28 PROCEDURE — G8427 DOCREV CUR MEDS BY ELIG CLIN: HCPCS | Performed by: SURGERY

## 2019-08-28 PROCEDURE — 99202 OFFICE O/P NEW SF 15 MIN: CPT | Performed by: SURGERY

## 2019-08-28 PROCEDURE — 3017F COLORECTAL CA SCREEN DOC REV: CPT | Performed by: SURGERY

## 2019-08-28 PROCEDURE — 1036F TOBACCO NON-USER: CPT | Performed by: SURGERY

## 2019-09-04 ENCOUNTER — OFFICE VISIT (OUTPATIENT)
Dept: SURGERY | Age: 49
End: 2019-09-04
Payer: MEDICAID

## 2019-09-04 ENCOUNTER — HOSPITAL ENCOUNTER (OUTPATIENT)
Age: 49
Setting detail: SPECIMEN
Discharge: HOME | End: 2019-09-04
Payer: MEDICAID

## 2019-09-04 VITALS
HEART RATE: 79 BPM | SYSTOLIC BLOOD PRESSURE: 144 MMHG | BODY MASS INDEX: 28.19 KG/M2 | WEIGHT: 191 LBS | DIASTOLIC BLOOD PRESSURE: 94 MMHG

## 2019-09-04 DIAGNOSIS — L98.9 SKIN LESION OF LEFT ARM: Primary | ICD-10-CM

## 2019-09-04 DIAGNOSIS — L98.9 SKIN LESION OF RIGHT ARM: ICD-10-CM

## 2019-09-04 DIAGNOSIS — L98.9 SKIN LESION OF SCALP: ICD-10-CM

## 2019-09-04 PROCEDURE — 99214 OFFICE O/P EST MOD 30 MIN: CPT | Performed by: SURGERY

## 2019-09-04 NOTE — PROGRESS NOTES
OPERATIVE NOTE    PATIENT: Edgar Craven    MRN: [id number]    DATE OF PROCEDURE: 9/4/2019     SURGEON: Dr. Rodrick Montes, PGY-1    PREOPERATIVE DIAGNOSIS: Skin lesions of left forearm, right bicep, and right scalp    POSTOPERATIVE DIAGNOSIS: Same     PROCEDURE: Excision of skin lesions on left forearm, right bicep, and right scalp    ANESTHESIA: Local    ESTIMATED BLOOD LOSS:  <47MY    COMPLICATIONS: None     SPECIMENS:  Was Obtained: 1. Left forearm skin lesion; 2. Right bicep skin lesion; 3. Right scalp skin lesion     HISTORY: The patient is a 47y.o. year old male presents to the [address] requesting the surgical removal of 3 lesions on his body. The lesions are a left forearm skin lesion, a right bicep skin lesion, and a right scalp skin lesion. Risks, benefits, expected outcome, and alternatives to the procedure were explained and all questions answered. Signed consent was obtained and patient prepped for procedure. PROCEDURE: Time out was performed identifying patient and procedure. Patient was placed in a supine position. The right scalp lesion was cleansed with alcohol and injected with 1 mL of 1% lidocaine without epinephrine. Betadine scrub was then used to cleanse the area and at this point a pair of fine smooth pickups were used to elevate the lesion, measuring approximately 5 mm x 3 mm, and a pair of iris scissors were used to incise the base of the soft fibromas. Silver nitrate cautery was then used to obtain hemostasis as the removed lesions were placed into a small formalin cup to be sent for formal pathology review. The right bicep lesion was then cleansed with alcohol and anesthetized using 2 mL of 1% lidocaine without epinephrine. The lesion, measuring approximately 3mm x 3mm, was cleansed with Betadine scrub. A 4 mm skin punch was used to completely encircle the lesion which was then sharply excised with the incision carrying down to the subcutaneous tissues.   A pair of

## 2019-09-06 LAB — DERMATOLOGY PATHOLOGY REPORT: NORMAL

## 2019-09-11 ENCOUNTER — TELEPHONE (OUTPATIENT)
Dept: FAMILY MEDICINE CLINIC | Age: 49
End: 2019-09-11

## 2019-09-11 ENCOUNTER — OFFICE VISIT (OUTPATIENT)
Dept: SURGERY | Age: 49
End: 2019-09-11
Payer: MEDICAID

## 2019-09-11 VITALS
TEMPERATURE: 98.6 F | SYSTOLIC BLOOD PRESSURE: 130 MMHG | HEIGHT: 69 IN | HEART RATE: 86 BPM | BODY MASS INDEX: 28.05 KG/M2 | DIASTOLIC BLOOD PRESSURE: 89 MMHG | WEIGHT: 189.4 LBS

## 2019-09-11 DIAGNOSIS — Z48.02 VISIT FOR SUTURE REMOVAL: Primary | ICD-10-CM

## 2019-09-11 DIAGNOSIS — Z51.89 VISIT FOR WOUND CHECK: ICD-10-CM

## 2019-09-11 PROCEDURE — G8427 DOCREV CUR MEDS BY ELIG CLIN: HCPCS | Performed by: SURGERY

## 2019-09-11 PROCEDURE — 3017F COLORECTAL CA SCREEN DOC REV: CPT | Performed by: SURGERY

## 2019-09-11 PROCEDURE — G8419 CALC BMI OUT NRM PARAM NOF/U: HCPCS | Performed by: SURGERY

## 2019-09-11 PROCEDURE — 1036F TOBACCO NON-USER: CPT | Performed by: SURGERY

## 2019-09-11 PROCEDURE — 99212 OFFICE O/P EST SF 10 MIN: CPT | Performed by: SURGERY

## 2019-09-11 NOTE — PATIENT INSTRUCTIONS
Thank you letting us take care of you today. We hope that all your questions were addressed. If a question was overlooked or something else comes to mind after you return home, please call our office at [phone]. If you need to cancel or change an appointment, surgery or procedure, please contact the office at [phone].

## 2019-09-11 NOTE — PROGRESS NOTES
Not on file    Number of children: Not on file    Years of education: Not on file    Highest education level: Not on file   Occupational History    Not on file   Social Needs    Financial resource strain: Not on file    Food insecurity:     Worry: Not on file     Inability: Not on file    Transportation needs:     Medical: Not on file     Non-medical: Not on file   Tobacco Use    Smoking status: Never Smoker    Smokeless tobacco: Never Used   Substance and Sexual Activity    Alcohol use: Yes     Alcohol/week: 6.0 standard drinks     Types: 6 Cans of beer per week     Comment: 6 pack of beer a week    Drug use: Yes     Types: Marijuana    Sexual activity: Not on file   Lifestyle    Physical activity:     Days per week: Not on file     Minutes per session: Not on file    Stress: Not on file   Relationships    Social connections:     Talks on phone: Not on file     Gets together: Not on file     Attends Anabaptism service: Not on file     Active member of club or organization: Not on file     Attends meetings of clubs or organizations: Not on file     Relationship status: Not on file    Intimate partner violence:     Fear of current or ex partner: Not on file     Emotionally abused: Not on file     Physically abused: Not on file     Forced sexual activity: Not on file   Other Topics Concern    Not on file   Social History Narrative    Not on file       ROS:  GEN: Denies recent weight loss, fatigue, fevers, chills. HEENT: No rhinorrhea, dysphagia, odynphagia. CV: No history of MI, recent chest pain. RESP: Denies shortness of breath, COPD, asthma. GI: denies hematochezia, changes in bowel function, NVD  : Denies increased frequency or dysuria. HEM[de-identified] Denies history of anemia or DVTs. ENDO: Denies history of thyroid problems or diabetes. NEURO: Denies history of CVA, TIA. Notes reviewed, and agree with documentation in pt's chart.      PHYSICAL EXAM:  Vitals:    09/11/19 0844   BP: 130/89

## 2019-11-27 ENCOUNTER — OFFICE VISIT (OUTPATIENT)
Dept: ORTHOPEDIC SURGERY | Age: 49
End: 2019-11-27
Payer: MEDICAID

## 2019-11-27 VITALS
WEIGHT: 196 LBS | DIASTOLIC BLOOD PRESSURE: 116 MMHG | HEART RATE: 89 BPM | HEIGHT: 69 IN | BODY MASS INDEX: 29.03 KG/M2 | SYSTOLIC BLOOD PRESSURE: 154 MMHG

## 2019-11-27 DIAGNOSIS — M19.172 POST-TRAUMATIC ARTHRITIS OF LEFT ANKLE: Primary | ICD-10-CM

## 2019-11-27 PROCEDURE — 99213 OFFICE O/P EST LOW 20 MIN: CPT | Performed by: FAMILY MEDICINE

## 2019-11-27 PROCEDURE — G8427 DOCREV CUR MEDS BY ELIG CLIN: HCPCS | Performed by: FAMILY MEDICINE

## 2019-11-27 PROCEDURE — 1036F TOBACCO NON-USER: CPT | Performed by: FAMILY MEDICINE

## 2019-11-27 PROCEDURE — G8484 FLU IMMUNIZE NO ADMIN: HCPCS | Performed by: FAMILY MEDICINE

## 2019-11-27 PROCEDURE — G8419 CALC BMI OUT NRM PARAM NOF/U: HCPCS | Performed by: FAMILY MEDICINE

## 2019-11-27 PROCEDURE — 20605 DRAIN/INJ JOINT/BURSA W/O US: CPT | Performed by: FAMILY MEDICINE

## 2019-11-27 PROCEDURE — 3017F COLORECTAL CA SCREEN DOC REV: CPT | Performed by: FAMILY MEDICINE

## 2019-11-27 RX ORDER — BUPIVACAINE HYDROCHLORIDE 5 MG/ML
1 INJECTION, SOLUTION PERINEURAL ONCE
Status: COMPLETED | OUTPATIENT
Start: 2019-11-27 | End: 2019-11-27

## 2019-11-27 RX ORDER — TRIAMCINOLONE ACETONIDE 40 MG/ML
40 INJECTION, SUSPENSION INTRA-ARTICULAR; INTRAMUSCULAR ONCE
Status: COMPLETED | OUTPATIENT
Start: 2019-11-27 | End: 2019-11-27

## 2019-11-27 RX ADMIN — TRIAMCINOLONE ACETONIDE 40 MG: 40 INJECTION, SUSPENSION INTRA-ARTICULAR; INTRAMUSCULAR at 11:28

## 2019-11-27 RX ADMIN — BUPIVACAINE HYDROCHLORIDE 5 MG: 5 INJECTION, SOLUTION PERINEURAL at 11:27

## 2020-01-02 ENCOUNTER — OFFICE VISIT (OUTPATIENT)
Dept: FAMILY MEDICINE CLINIC | Age: 50
End: 2020-01-02
Payer: MEDICAID

## 2020-01-02 VITALS
BODY MASS INDEX: 29.18 KG/M2 | HEIGHT: 69 IN | HEART RATE: 106 BPM | DIASTOLIC BLOOD PRESSURE: 98 MMHG | SYSTOLIC BLOOD PRESSURE: 146 MMHG | WEIGHT: 197 LBS

## 2020-01-02 PROCEDURE — G8427 DOCREV CUR MEDS BY ELIG CLIN: HCPCS | Performed by: FAMILY MEDICINE

## 2020-01-02 PROCEDURE — 1036F TOBACCO NON-USER: CPT | Performed by: FAMILY MEDICINE

## 2020-01-02 PROCEDURE — 99213 OFFICE O/P EST LOW 20 MIN: CPT | Performed by: FAMILY MEDICINE

## 2020-01-02 PROCEDURE — 3017F COLORECTAL CA SCREEN DOC REV: CPT | Performed by: FAMILY MEDICINE

## 2020-01-02 PROCEDURE — G8484 FLU IMMUNIZE NO ADMIN: HCPCS | Performed by: FAMILY MEDICINE

## 2020-01-02 PROCEDURE — G8419 CALC BMI OUT NRM PARAM NOF/U: HCPCS | Performed by: FAMILY MEDICINE

## 2020-01-02 ASSESSMENT — PATIENT HEALTH QUESTIONNAIRE - PHQ9
SUM OF ALL RESPONSES TO PHQ9 QUESTIONS 1 & 2: 0
2. FEELING DOWN, DEPRESSED OR HOPELESS: 0
1. LITTLE INTEREST OR PLEASURE IN DOING THINGS: 0
SUM OF ALL RESPONSES TO PHQ QUESTIONS 1-9: 0
SUM OF ALL RESPONSES TO PHQ QUESTIONS 1-9: 0

## 2020-01-05 NOTE — PROGRESS NOTES
Follow up several issues:  treatment of onychomycosis, anxiety, wrist pain. States healthy nail is definitely growing in. Anxiety is doing well with no medication. IIs increasing activity and feeling well. Wrist pain currently resolved. Vitals:    01/02/20 1119   BP: (!) 146/98   Pulse:      Vitals reviewed. weight maintaining. Neck-neg masses, thyroid kimberly. Lungs clear in all fields. CV- RRR with normal heart sounds. Neg peripheral edema             Diagnosis Orders   1. Colon cancer screening  Cologuard (For External Results Only)   2. Elevated BP without diagnosis of hypertension. After discussion, patient preferred to work on exercise and mild weight loss and recheck BP in six months at follow up then.

## 2020-01-20 ENCOUNTER — OFFICE VISIT (OUTPATIENT)
Dept: ORTHOPEDIC SURGERY | Age: 50
End: 2020-01-20
Payer: MEDICAID

## 2020-01-20 VITALS — BODY MASS INDEX: 29.19 KG/M2 | HEIGHT: 69 IN | WEIGHT: 197.09 LBS

## 2020-01-20 PROCEDURE — G8419 CALC BMI OUT NRM PARAM NOF/U: HCPCS | Performed by: ORTHOPAEDIC SURGERY

## 2020-01-20 PROCEDURE — 1036F TOBACCO NON-USER: CPT | Performed by: ORTHOPAEDIC SURGERY

## 2020-01-20 PROCEDURE — G8427 DOCREV CUR MEDS BY ELIG CLIN: HCPCS | Performed by: ORTHOPAEDIC SURGERY

## 2020-01-20 PROCEDURE — G8484 FLU IMMUNIZE NO ADMIN: HCPCS | Performed by: ORTHOPAEDIC SURGERY

## 2020-01-20 PROCEDURE — 99213 OFFICE O/P EST LOW 20 MIN: CPT | Performed by: ORTHOPAEDIC SURGERY

## 2020-01-20 PROCEDURE — 3017F COLORECTAL CA SCREEN DOC REV: CPT | Performed by: ORTHOPAEDIC SURGERY

## 2020-01-20 NOTE — PROGRESS NOTES
[address] [place] [address] ORTHOPEDICS AND SPORTS MEDICINE  [address] [facility] [address]  Dept: [phone]    Ambulatory Orthopedic Consult      CHIEF COMPLAINT:    Chief Complaint   Patient presents with    Follow-up     left ankle        HISTORY OF PRESENT ILLNESS:      The patient is a 47 y.o. male who is being seen for consultation and evaluation of pain at the ankle joint, located mainly anteriorly, which began secondary to a hockey injury in [address] [place] [address], in which she sustained a tibial plafond fracture, treated with bone grafting from his proximal tibia but no hardware. The pain is a dull aching type of pain with occasional sharp stabbing character. The patient reports an associated swelling and stiffness. The pain is worse with activity and better with rest. The patient reports a progressive course. The patient has tried:      [x]  Rest/activity modification     [x]  NSAIDs/Acetaminophen    []  Opioid pain medications     [x]  Home exercises    []  Physical therapy      [x]  Change in shoe wear    []  Orthotics       []  CAM boot    []  Brace:         []  Injection    [x]  Surgery:       INTERVAL HISTORY 1/20/2020:  He is seen again today in the office for follow up, having been seen here last Visit date not found. Since being seen last, he reports the problem has improved in terms of pain secondary to the injection. He received an injection of the left tibiotalar joint, and reports the injection helped approximately 50% in terms of pain. He is here today with his fiancée, and also reports that he has lost some weight, and is eating an anti-inflammatory diet, which he believes is helping. REVIEW OF SYSTEMS:  Constitutional: Negative for fever. HENT: Negative for tinnitus. Eyes: Negative for pain. Respiratory: Negative for shortness of breath. Cardiovascular: Negative for chest pain. Gastrointestinal: Negative for abdominal pain.    Genitourinary: Negative for dysuria. Skin: Negative for rash. Neurological: Negative for headaches. Hematological: Does not bruise/bleed easily. Musculoskeletal: See HPI for pertinent positives       Past Medical History:    Past Medical History:   Diagnosis Date    Allergic rhinitis        Past Surgical History:    Past Surgical History:   Procedure Laterality Date    ANTERIOR CRUCIATE LIGAMENT REPAIR      ORIF FEMUR DECOMPRESSION      ankle       Current Medications:   No current outpatient medications on file. No current facility-administered medications for this visit. Allergies:    Patient has no known allergies. Family History:  Family History   Problem Relation Age of Onset    Cancer Mother     Heart Disease Father     No Known Problems Sister     No Known Problems Brother     No Known Problems Maternal Aunt     No Known Problems Maternal Uncle     No Known Problems Maternal Grandmother     Heart Attack Maternal Grandfather     No Known Problems Paternal Grandmother     No Known Problems Paternal Grandfather        Social History:   Social History     Socioeconomic History    Marital status: Single     Spouse name: Not on file    Number of children: Not on file    Years of education: Not on file    Highest education level: Not on file   Occupational History    Not on file   Social Needs    Financial resource strain: Not on file    Food insecurity:     Worry: Not on file     Inability: Not on file    Transportation needs:     Medical: Not on file     Non-medical: Not on file   Tobacco Use    Smoking status: Never Smoker    Smokeless tobacco: Never Used   Substance and Sexual Activity    Alcohol use:  Yes     Alcohol/week: 6.0 standard drinks     Types: 6 Cans of beer per week     Comment: 6 pack of beer a week    Drug use: Yes     Types: Marijuana    Sexual activity: Not on file   Lifestyle    Physical activity:     Days per week: Not on file     Minutes per session: Not on file    Stress: Equinus       []  Achilles       []  PTT       []  Peroneus Longus     [x]  Unable to assess due to decreased ROM    Instability: WNL/negative tests below:      [x]  Varus stress laxity      [x]  Valgus stress laxity      UNAFFECTED EXTREMITY (right lower extremity):  Alignment:  Heel neutral  Vascular: Toes warm and well perfused, compartments soft/compressible, no swelling of ankle. Skin: Intact without rash/lesions/AV malformations. Strength: Able to fire/perform the following with appropriate strength:    [x]  Tib Ant:     [x]  Gastroc-Soleus:         [x]  Inversion:    [x]  Eversion:         [x]  FHL:     [x]  EHL:      Motion:  Normal for the following joints:     [x]  Ankle:     []  Subtalar:    Diminished    [x]  1st MTP:        []  1st TMT:            Tenderness to Palpation:    No tenderness to palpation:  bony prominences or joints    Muscle Contractures:       []  Gastroc Equinus       []  Achilles       []  PTT       []  Peroneus Longus     []  Unable to assess due to decreased ROM    Instability: WNL/negative tests below:      [x]  Varus stress laxity      [x]  Valgus stress laxity        RADIOLOGY:   1/20/2020 FINDINGS:  One weightbearing view (AP Limb Length) of the bilateral lower extremities was obtained in the office today and reviewed, revealing no acute fracture, dislocation, or radioopaque foreign body/tumor. Overall alignment is satisfactory. IMPRESSION:  No acute fracture/dislocation. Degenerative changes of Left tibiotalar joint. Electronically signed by Cody Anthony MD      FINDINGS:  Three weightbearing views (AP, Mortise, and Lateral) of the left ankle and three weightbearing views (AP, Oblique, Lateral) of the left foot were obtained in the office today and reviewed, revealing no acute fracture, dislocation, or radioopaque foreign body/tumor. The ankle mortise is maintained with no widening of the clear spaces. Overall alignment is satisfactory.     Also noted:  osteophytes of the dorsal neck of the talus and anterior distal tibia and degenerative changes of Tibiotalar joint greater than talonavicular and subtalar joints. IMPRESSION:    No acute fracture/dislocation. Degenerative changes of Tibiotalar joint with osteophytes, as well as some degenerative changes of the subtalar joint and talonavicular joint. .         ASSESSMENT AND PLAN:  Tl Tapia was seen today for Follow-up (left ankle )  The encounter diagnosis was Post-traumatic arthritis of left ankle. Body mass index is 29.11 kg/m². He has left posttraumatic ankle arthritis, secondary to a tibial plafond fracture in 1996, status post bone grafting (no internal fixation used). Notably, he does not have any relevant past medical issues. I had another discussion today with the patient about the likely diagnosis and its natural history, physical exam and imaging findings, as well as treatment options. We discussed both surgical and nonsurgical treatments, including risks and benefits. From a nonoperative standpoint, we discussed rest/activity modification, weight loss,  NSAIDs/Acetaminophen/topical anesthetics, alternative shoe wear/modification, orthotics, bracing/immobilization, physical therapy, injections, and possible future/advanced imaging. We also discussed intermittent ice (15-20 minutes every  minutes while awake), and elevation (about 3-4\" above the heart), to help with swelling and pain, as well as compression socks. From a surgical standpoint, we discussed tibiotalar arthrodesis as well as total ankle replacement, with possible ANANT versus gastroc, and soft tissue balancing as needed. We also discussed that they both provide roughly equivalent levels of pain relief as well as postoperative activity. Given his relatively young age, as well as the posttraumatic nature of his degeneration, I have recommended a tibiotalar arthrodesis, in the future whenever he fails nonoperative treatment.   At this point,

## 2020-05-27 ENCOUNTER — NURSE TRIAGE (OUTPATIENT)
Dept: OTHER | Facility: CLINIC | Age: 50
End: 2020-05-27

## 2020-05-27 NOTE — TELEPHONE ENCOUNTER
Reason for Disposition   Age > 48 and no history of prior similar back pain    Answer Assessment - Initial Assessment Questions  1. ONSET: \"When did the pain begin? \"       \"3 weeks ago I tweaked it, over the past weekend it was worse. \"  2. LOCATION: \"Where does it hurt? \" (upper, mid or lower back)      Lower back. 3. SEVERITY: \"How bad is the pain? \"  (e.g., Scale 1-10; mild, moderate, or severe)    - MILD (1-3): doesn't interfere with normal activities     - MODERATE (4-7): interferes with normal activities or awakens from sleep     - SEVERE (8-10): excruciating pain, unable to do any normal activities        \"Stiff feeling, I was able to manage the pain with ibuprofen and rest all weekend. At this time it is a 3/10. Over the weekend it put me in tears 8/10. \"    4. PATTERN: \"Is the pain constant? \" (e.g., yes, no; constant, intermittent)       \"Constant\"    5. RADIATION: \"Does the pain shoot into your legs or elsewhere? \"      \"No\"    6. CAUSE:  \"What do you think is causing the back pain? \"       \"3 weeks ago I was helping a friend move a large piece of furniture. We were going down a stairwell. I was on the bottom of the carrying end. I felt something in my back and that is when the stiffness and everything started happening in that moment. After that I continued to move things so I probably exacerbated the situation. \"      7. BACK OVERUSE:  Vanessa Xavier recent lifting of heavy objects, strenuous work or exercise? \"  \"Mother's day I was playing volleyball in the back yard, I may have hurt it that day. Otherwise I can't pinpoint anything else. \"        8. MEDICATIONS: \"What have you taken so far for the pain? \" (e.g., nothing, acetaminophen, NSAIDS)      \"Ibuprofen over the weekend, and rest.\"    9. NEUROLOGIC SYMPTOMS: \"Do you have any weakness, numbness, or problems with bowel/bladder control? \"      \"No\"    10. OTHER SYMPTOMS: \"Do you have any other symptoms? \" (e.g., fever, abdominal pain, burning with urination, blood in urine)        \"No\"    11. PREGNANCY: \"Is there any chance you are pregnant? \" (e.g., yes, no; LMP)        n/a    Protocols used: BACK PAIN-ADULT-[place]    Discussed recommendations and care advice with patient. He verbalizes his understanding. Warm transfer to [facility] to assist patient.

## 2020-05-29 ENCOUNTER — OFFICE VISIT (OUTPATIENT)
Dept: FAMILY MEDICINE CLINIC | Age: 50
End: 2020-05-29
Payer: MEDICAID

## 2020-05-29 VITALS
DIASTOLIC BLOOD PRESSURE: 103 MMHG | SYSTOLIC BLOOD PRESSURE: 151 MMHG | HEART RATE: 102 BPM | TEMPERATURE: 96.5 F | BODY MASS INDEX: 26.93 KG/M2 | WEIGHT: 181.8 LBS | HEIGHT: 69 IN

## 2020-05-29 PROCEDURE — G8427 DOCREV CUR MEDS BY ELIG CLIN: HCPCS | Performed by: FAMILY MEDICINE

## 2020-05-29 PROCEDURE — G8419 CALC BMI OUT NRM PARAM NOF/U: HCPCS | Performed by: FAMILY MEDICINE

## 2020-05-29 PROCEDURE — 99213 OFFICE O/P EST LOW 20 MIN: CPT | Performed by: FAMILY MEDICINE

## 2020-05-29 PROCEDURE — 3017F COLORECTAL CA SCREEN DOC REV: CPT | Performed by: FAMILY MEDICINE

## 2020-05-29 PROCEDURE — 1036F TOBACCO NON-USER: CPT | Performed by: FAMILY MEDICINE

## 2020-05-29 RX ORDER — LISINOPRIL 10 MG/1
10 TABLET ORAL DAILY
Qty: 90 TABLET | Refills: 0 | Status: SHIPPED | OUTPATIENT
Start: 2020-05-29 | End: 2021-01-27

## 2020-05-29 NOTE — PROGRESS NOTES
Peter Rdz is here today to follow-up on past recorded readings of elevated blood pressure. His other complaint today is an episode of low back pain that occurred approximately 10 days to 2 weeks ago. He states that it not really bothering him that much now for about 2 days. He states he was helping a friend move some heavy objects and he felt a pull in his back. Then over the next several days he had increased tightening and stiffness and difficulty straightening up and walking. He states that at no time did he have radiation to his legs. Vitals:    05/29/20 1139   BP: (!) 151/103   Pulse:    Temp:        Vitals reviewed. weight maintaining. Neck-neg masses, thyroid kimberly. Lungs clear in all fields. CV- RRR with normal heart sounds. Neg peripheral edema  Gait- walks easily and evenly. Straight leg test negative bilaterally. 5/5 strength bilat lower extremities  No tenderness over lumbar area, or area he identifies as where the soreness was. Gets in and out of chair with no difficulty. Diagnosis Orders   1. Essential hypertension  lisinopril (PRINIVIL;ZESTRIL) 10 MG tablet. Prescription started today. Answered patient's questions about possible side effects and expected benefits of medication. Long discussion with patient about the severe potential health risks from untreated hypertension including heart disease stroke and kidney disease. He expressed understanding and was able to teach back to me the points of our discussion. 2. Low back pain. Discussed the etiology and nature of mechanical low back pain. Discussed that this is most likely what it is due to him having an inciting event that he could particularly identify and its quick resolution. Dispensed patient information on back care, back pain resolution and back exercises. 3.follow up six weeks.

## 2020-05-29 NOTE — PROGRESS NOTES
Visit Information    Have you changed or started any medications since your last visit including any over-the-counter medicines, vitamins, or herbal medicines? no   Have you stopped taking any of your medications? Is so, why? -  no  Are you having any side effects from any of your medications? - no    Have you seen any other physician or provider since your last visit? yes - Orthopedic   Have you had any other diagnostic tests since your last visit?  no   Have you been seen in the emergency room and/or had an admission in a hospital since we last saw you?  no   Have you had your routine dental cleaning in the past 6 months?  no     Do you have an active MyChart account? If no, what is the barrier?   Yes    Patient Care Team:  General Darinel MD as PCP - General (Family Medicine)  General Darinel MD as PCP - [facility]    Medical History Review  Past Medical, Family, and Social History reviewed and does not contribute to the patient presenting condition    Health Maintenance   Topic Date Due    Colon cancer screen colonoscopy  03/25/2019    DTaP/Tdap/Td vaccine (1 - Tdap) 01/02/2021 (Originally 3/25/1989)    Flu vaccine (Season Ended) 01/02/2021 (Originally 9/1/2020)    Shingles Vaccine (1 of 2) 01/02/2021 (Originally 3/25/2019)    Diabetes screen  07/22/2022    Lipid screen  03/08/2024    HIV screen  Completed    Hepatitis A vaccine  Aged Out    Hepatitis B vaccine  Aged Out    Hib vaccine  Aged Out    Meningococcal (ACWY) vaccine  Aged Out    Pneumococcal 0-64 years Vaccine  Aged Out

## 2020-05-29 NOTE — PATIENT INSTRUCTIONS
at all times, and do not let one hip drop lower than the other. 1. Start on the floor, on your hands and knees. 2. Tighten your belly muscles. 3. Raise one leg off the floor, and hold it straight out behind you. Be careful not to let your hip drop down, because that will twist your trunk. 4. Hold for about 6 seconds, then lower your leg and switch to the other leg. 5. Repeat 8 to 12 times on each leg. 6. Over time, work up to holding for 10 to 30 seconds each time. 7. If you feel stable and secure with your leg raised, try raising the opposite arm straight out in front of you at the same time. Knee-to-chest exercise   1. Lie on your back with your knees bent and your feet flat on the floor. 2. Bring one knee to your chest, keeping the other foot flat on the floor (or keeping the other leg straight, whichever feels better on your lower back). 3. Keep your lower back pressed to the floor. Hold for at least 15 to 30 seconds. 4. Relax, and lower the knee to the starting position. 5. Repeat with the other leg. Repeat 2 to 4 times with each leg. 6. To get more stretch, put your other leg flat on the floor while pulling your knee to your chest.    Curl-ups   1. Lie on the floor on your back with your knees bent at a 90-degree angle. Your feet should be flat on the floor, about 12 inches from your buttocks. 2. Cross your arms over your chest. If this bothers your neck, try putting your hands behind your neck (not your head), with your elbows spread apart. 3. Slowly tighten your belly muscles and raise your shoulder blades off the floor. 4. Keep your head in line with your body, and do not press your chin to your chest.  5. Hold this position for 1 or 2 seconds, then slowly lower yourself back down to the floor. 6. Repeat 8 to 12 times. Pelvic tilt exercise   1. Lie on your back with your knees bent. 2. \"Brace\" your stomach.  This means to tighten your muscles by pulling in and imagining your belly have other side effects or reactions not listed here. Check the information that comes with your medicine. What to know about taking this medicine  · ACE inhibitors can cause a dry cough. Talk to your doctor if you have a dry cough. You may need a different medicine. · These medicines can cause an allergic reaction. This can cause a little swelling. Or it can cause red bumps on your skin that hurt. In rare cases, the swelling may make it hard for you to breathe. · Do not take this medicine if you are pregnant or plan to become pregnant. · Take your medicines exactly as prescribed. Call your doctor if you think you are having a problem with your medicine. · Check with your doctor or pharmacist before you use any other medicines. This includes over-the-counter medicines. Make sure your doctor knows all of the medicines, vitamins, herbal products, and supplements you take. Taking some medicines together can cause problems. · You may need regular blood tests. Where can you learn more? Go to https://[employer]. org and sign in to your [employer] account. Enter P050 in the [facility] box to learn more about \"Learning About ACE Inhibitors. \"     If you do not have an account, please click on the \"Sign Up Now\" link. Current as of: December 16, 2019               Content Version: 12.5  © 4174-0500 [employer]. Care instructions adapted under license by [facility] ([facility]). If you have questions about a medical condition or this instruction, always ask your healthcare professional. [place] [address] any warranty or liability for your use of this information.

## 2020-06-25 ENCOUNTER — TELEPHONE (OUTPATIENT)
Dept: ORTHOPEDIC SURGERY | Age: 50
End: 2020-06-25

## 2020-06-26 ENCOUNTER — OFFICE VISIT (OUTPATIENT)
Dept: ORTHOPEDIC SURGERY | Age: 50
End: 2020-06-26
Payer: MEDICAID

## 2020-06-26 VITALS — HEIGHT: 69 IN | BODY MASS INDEX: 26.66 KG/M2 | WEIGHT: 180 LBS

## 2020-06-26 PROCEDURE — 3017F COLORECTAL CA SCREEN DOC REV: CPT | Performed by: FAMILY MEDICINE

## 2020-06-26 PROCEDURE — 1036F TOBACCO NON-USER: CPT | Performed by: FAMILY MEDICINE

## 2020-06-26 PROCEDURE — 20606 DRAIN/INJ JOINT/BURSA W/US: CPT | Performed by: FAMILY MEDICINE

## 2020-06-26 PROCEDURE — G8419 CALC BMI OUT NRM PARAM NOF/U: HCPCS | Performed by: FAMILY MEDICINE

## 2020-06-26 PROCEDURE — 99213 OFFICE O/P EST LOW 20 MIN: CPT | Performed by: FAMILY MEDICINE

## 2020-06-26 PROCEDURE — G8427 DOCREV CUR MEDS BY ELIG CLIN: HCPCS | Performed by: FAMILY MEDICINE

## 2020-06-26 RX ORDER — TRIAMCINOLONE ACETONIDE 40 MG/ML
40 INJECTION, SUSPENSION INTRA-ARTICULAR; INTRAMUSCULAR ONCE
Status: COMPLETED | OUTPATIENT
Start: 2020-06-26 | End: 2020-06-26

## 2020-06-26 RX ORDER — BUPIVACAINE HYDROCHLORIDE 5 MG/ML
4 INJECTION, SOLUTION PERINEURAL ONCE
Status: COMPLETED | OUTPATIENT
Start: 2020-06-26 | End: 2020-06-26

## 2020-06-26 RX ADMIN — BUPIVACAINE HYDROCHLORIDE 20 MG: 5 INJECTION, SOLUTION PERINEURAL at 09:23

## 2020-06-26 RX ADMIN — TRIAMCINOLONE ACETONIDE 40 MG: 40 INJECTION, SUSPENSION INTRA-ARTICULAR; INTRAMUSCULAR at 09:23

## 2020-06-26 NOTE — PROGRESS NOTES
Sports Medicine Consultation      CHIEF COMPLAINT:  Ankle Pain (Left)  . HPI:   The patient is a 45 y.o. male who is being seen in   established patient being seen for regarding follow up of a pre-existing problem  left foot/ankle pain. The patient states the pain has been present for years. As far as trauma to the area, the patient indicates no new. There is  pain with weight bearing. The patient states numbness and tingling is not present. Catching and locking has not been present. He has tried cortisone and PT with relief. he has a past medical history of Allergic rhinitis. he has a past surgical history that includes Anterior cruciate ligament repair and orif femur decompression. family history includes Cancer in his mother; Heart Attack in his maternal grandfather; Heart Disease in his father; No Known Problems in his brother, maternal aunt, maternal grandmother, maternal uncle, paternal grandfather, paternal grandmother, and sister. Social History     Socioeconomic History    Marital status: Single     Spouse name: Not on file    Number of children: Not on file    Years of education: Not on file    Highest education level: Not on file   Occupational History    Not on file   Social Needs    Financial resource strain: Not on file    Food insecurity     Worry: Not on file     Inability: Not on file    Transportation needs     Medical: Not on file     Non-medical: Not on file   Tobacco Use    Smoking status: Never Smoker    Smokeless tobacco: Never Used   Substance and Sexual Activity    Alcohol use:  Yes     Alcohol/week: 6.0 standard drinks     Types: 6 Cans of beer per week     Comment: 6 pack of beer a week    Drug use: Yes     Types: Marijuana    Sexual activity: Not on file   Lifestyle    Physical activity     Days per week: Not on file     Minutes per session: Not on file    Stress: Not on file   Relationships    Social connections     Talks on phone: Not No obvious deformity or swelling. NEURO: Musculoskeletal and axillary nerves intact to sensory and motor testing. EYES:  Extraocular muscles intact. MOUTH: Oral mucosa moist.  No perioral lesions. PULM:  Respirations unlabored and regular. VASC:  Capillary refill less than 3 seconds. Distal pulses are palpable. There is no lymphadenopathy. Ankle Exam:    Reveals there is not effusion. Swelling is  present. Edema is not present. Ecchymoses is not present. Palpation-Tenderness  Diffuse ankle joint  The foot is in functional alignment. ROM:  Globally decr. Strength-WNL  Sensation-normal to light touch  Special Tests-Ankle inversion: laxity negative  Ankle eversion: laxity negative  Ankle drawer: laxity negative  External rotation:negative  Syndesmotic Squeeze test: negative  Gait: Antalgic    PSYCH:  Patient has good fund of knowledge and displays understanding of exam.    RADIOLOGY: No results found. IMPRESSION:     1. Post-traumatic arthritis of left ankle        PLAN:   We discussed some of the etiologies and natural histories of     ICD-10-CM    1. Post-traumatic arthritis of left ankle M19.172 bupivacaine (MARCAINE) 0.5 % injection 20 mg     triamcinolone acetonide (KENALOG-40) injection 40 mg    We discussed the various treatment alternatives including anti-inflammatory medications, physical therapy, injections, further imaging studies and as a last resort surgery.   This point patient has done well with intra-articular cortisone injections and after the risk, benefits, alternatives of procedure itself were discussed structures of the lateral tibiotalar joint visualized under ultrasound with image capture vascular structures to be noted the area was prepped in a sterile manner with Betadine skin was then cooled cold spray and recleaned with alcohol prep under ultrasound guidance with image capture of needle placement I injected 1 mL 40 mg Kenalog and 1 mL 0.5% Marcaine patient tolerated

## 2021-01-27 ENCOUNTER — OFFICE VISIT (OUTPATIENT)
Dept: ORTHOPEDIC SURGERY | Age: 51
End: 2021-01-27
Payer: MEDICAID

## 2021-01-27 VITALS
DIASTOLIC BLOOD PRESSURE: 108 MMHG | TEMPERATURE: 96.3 F | BODY MASS INDEX: 26.07 KG/M2 | HEART RATE: 106 BPM | WEIGHT: 176 LBS | SYSTOLIC BLOOD PRESSURE: 157 MMHG | HEIGHT: 69 IN

## 2021-01-27 DIAGNOSIS — M19.172 POST-TRAUMATIC ARTHRITIS OF LEFT ANKLE: Primary | ICD-10-CM

## 2021-01-27 PROCEDURE — 20606 DRAIN/INJ JOINT/BURSA W/US: CPT | Performed by: FAMILY MEDICINE

## 2021-01-27 PROCEDURE — 99213 OFFICE O/P EST LOW 20 MIN: CPT | Performed by: FAMILY MEDICINE

## 2021-01-27 PROCEDURE — 1036F TOBACCO NON-USER: CPT | Performed by: FAMILY MEDICINE

## 2021-01-27 PROCEDURE — G8484 FLU IMMUNIZE NO ADMIN: HCPCS | Performed by: FAMILY MEDICINE

## 2021-01-27 PROCEDURE — 3017F COLORECTAL CA SCREEN DOC REV: CPT | Performed by: FAMILY MEDICINE

## 2021-01-27 PROCEDURE — G8427 DOCREV CUR MEDS BY ELIG CLIN: HCPCS | Performed by: FAMILY MEDICINE

## 2021-01-27 PROCEDURE — G8419 CALC BMI OUT NRM PARAM NOF/U: HCPCS | Performed by: FAMILY MEDICINE

## 2021-01-27 RX ORDER — TRIAMCINOLONE ACETONIDE 40 MG/ML
40 INJECTION, SUSPENSION INTRA-ARTICULAR; INTRAMUSCULAR ONCE
Status: COMPLETED | OUTPATIENT
Start: 2021-01-27 | End: 2021-01-27

## 2021-01-27 RX ORDER — BUPIVACAINE HYDROCHLORIDE 5 MG/ML
1 INJECTION, SOLUTION PERINEURAL ONCE
Status: COMPLETED | OUTPATIENT
Start: 2021-01-27 | End: 2021-01-27

## 2021-01-27 RX ADMIN — TRIAMCINOLONE ACETONIDE 40 MG: 40 INJECTION, SUSPENSION INTRA-ARTICULAR; INTRAMUSCULAR at 11:18

## 2021-01-27 RX ADMIN — BUPIVACAINE HYDROCHLORIDE 5 MG: 5 INJECTION, SOLUTION PERINEURAL at 11:18

## 2021-01-27 NOTE — PROGRESS NOTES
Sports Medicine Consultation      CHIEF COMPLAINT:  Ankle Pain (Lt ankle f/u. wants injection. last injection 6/26/2020. got about 3-4m relief from last injection)  . HPI:   The patient is a 45 y.o. male who is being seen in   established patient being seen for regarding follow up of a pre-existing problem  left foot/ankle pain. The patient states the pain has been present for  years. As far as trauma to the area, the patient indicates no new. There is  pain with weight bearing. The patient states numbness and tingling occ present. Catching and locking def occ been present. He has tried cortisone, pt, brace with relief. he has a past medical history of Allergic rhinitis. he has a past surgical history that includes Anterior cruciate ligament repair and orif femur decompression. family history includes Cancer in his mother; Heart Attack in his maternal grandfather; Heart Disease in his father; No Known Problems in his brother, maternal aunt, maternal grandmother, maternal uncle, paternal grandfather, paternal grandmother, and sister. Social History     Socioeconomic History    Marital status: Single     Spouse name: Not on file    Number of children: Not on file    Years of education: Not on file    Highest education level: Not on file   Occupational History    Not on file   Social Needs    Financial resource strain: Not on file    Food insecurity     Worry: Not on file     Inability: Not on file    Transportation needs     Medical: Not on file     Non-medical: Not on file   Tobacco Use    Smoking status: Never Smoker    Smokeless tobacco: Never Used   Substance and Sexual Activity    Alcohol use:  Yes     Alcohol/week: 6.0 standard drinks     Types: 6 Cans of beer per week     Comment: 6 pack of beer a week    Drug use: Yes     Types: Marijuana    Sexual activity: Not on file   Lifestyle    Physical activity     Days per week: Not on file Minutes per session: Not on file    Stress: Not on file   Relationships    Social connections     Talks on phone: Not on file     Gets together: Not on file     Attends [employer] service: Not on file     Active member of club or organization: Not on file     Attends meetings of clubs or organizations: Not on file     Relationship status: Not on file    Intimate partner violence     Fear of current or ex partner: Not on file     Emotionally abused: Not on file     Physically abused: Not on file     Forced sexual activity: Not on file   Other Topics Concern    Not on file   Social History Narrative    Not on file       No current outpatient medications on file. No current facility-administered medications for this visit. Allergies:  hehas No Known Allergies. ROS:  CV:  Denies chest pain; palpitations; shortness of breath; swelling of feet, ankles; and loss of consciousness. CON: Denies fever and dizziness. ENT:  Denies hearing loss / ringing, ear infections hoarseness, and swallowing problems. RESP:  Denies chronic cough, spitting up blood, and asthma/wheezing. GI: Denies abdominal pain, change in bowel habits, nausea or vomiting, and blood in stools. :  Denies frequent urination, burning or painful urination, blood in the urine, and bladder incontinence. NEURO:  Denies headache, memory loss, sleep disturbance, and tremor or movement disorder. 11 system review of systems is otherwise negative unless noted in HPI    PHYSICAL EXAM:   BP (!) 157/108 (Site: Left Upper Arm)   Pulse 106   Temp 96.3 °F (35.7 °C)   Ht 5' 9\" (1.753 m)   Wt 176 lb (79.8 kg)   BMI 25.99 kg/m²   GENERAL: Humberto Rogers is a 45 y.o. male who is alert and oriented and sitting comfortably in our office. SKIN:  Intact without rashes, lesions or ulcerations. No obvious deformity or swelling. NEURO: Musculoskeletal and axillary nerves intact to sensory and motor testing. EYES:  Extraocular muscles intact. MOUTH: Oral mucosa moist.  No perioral lesions. PULM:  Respirations unlabored and regular. VASC:  Capillary refill less than 3 seconds. Distal pulses are palpable. There is no lymphadenopathy. Ankle Exam:    Reveals there is not effusion. Swelling is  present. Edema is  present. Ecchymoses is not present. Palpation-Tenderness mild diffuse  The foot is in normal alignment. ROM:  40 degrees plantarflexion and 20 degrees dorsiflexion. Subtalar motion is 30 degrees inversion and 20 degrees eversion. Strength-WNL  Sensation-normal to light touch  Special Tests-Ankle inversion: laxity negative  Ankle eversion: laxity negative  Ankle drawer: laxity negative  External rotation:negative  Syndesmotic Squeeze test: negative    Gait: Antalgic    PSYCH:  Patient has good fund of knowledge and displays understanding of exam.    RADIOLOGY: No results found. 3 views of the left foot/ankle were ordered, independently visualized by me, and discussed with patient. 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  Post-traumatic arthritis of left ankle        PLAN:   We discussed some of the etiologies and natural histories of     ICD-10-CM    1. Post-traumatic arthritis of left ankle  M19.172 XR ANKLE LEFT (MIN 3 VIEWS) We discussed the various treatment alternatives including anti-inflammatory medications, physical therapy, injections, further imaging studies and as a last resort surgery. At this point patient has done very well with cortisone injections and I do think another one is reasonable especially seeing as it is not progressing his osteoarthritis 1 was administered in the manner as typed in the chart under ultrasound guidance for safety and greater efficacy patient tolerated procedure well. We will have him follow-up with us as needed we briefly discussed right knee pain for which I am more than happy to see him for in the future    No follow-ups on file. Please be aware portions of this note were completed using voice recognition software and unforeseen errors may have occurred    Electronically signed by Unique Holguin DO, FAOASM  on 1/27/21 at 9:32 AM EST    No orders of the defined types were placed in this encounter. After the risks, benefits, alternatives and procedure of a  left ultrasound guided lateral tibiotalar joint injection were discussed. Consent was obtained and a time out was performed. Structures of the lateral tibiotalar joint were visualized under ultrasound with image capture to be uploaded into the electronic medical record and notation of vascular structures to be avoided. Area was prepped in a sterile manner with Betadine. The skin was then cooled with cold spray and re-cleaned with alcohol prep. Then under ultrasound guidance I injected 1 mL of 40 mg of kenalog and 1 mL of 0.5% Marcaine into the lateral tibiotalar joint with image capture of the injection to be uploaded into the electronic medical record. Patient tolerated the procedure well.

## 2021-03-05 ENCOUNTER — TELEPHONE (OUTPATIENT)
Dept: FAMILY MEDICINE CLINIC | Age: 51
End: 2021-03-05

## 2021-04-01 ENCOUNTER — OFFICE VISIT (OUTPATIENT)
Dept: FAMILY MEDICINE CLINIC | Age: 51
End: 2021-04-01
Payer: MEDICAID

## 2021-04-01 VITALS
HEART RATE: 98 BPM | WEIGHT: 182 LBS | DIASTOLIC BLOOD PRESSURE: 108 MMHG | BODY MASS INDEX: 26.88 KG/M2 | SYSTOLIC BLOOD PRESSURE: 150 MMHG | TEMPERATURE: 96.8 F

## 2021-04-01 DIAGNOSIS — M25.561 RIGHT KNEE PAIN, UNSPECIFIED CHRONICITY: Primary | ICD-10-CM

## 2021-04-01 PROCEDURE — G8427 DOCREV CUR MEDS BY ELIG CLIN: HCPCS | Performed by: FAMILY MEDICINE

## 2021-04-01 PROCEDURE — 99213 OFFICE O/P EST LOW 20 MIN: CPT | Performed by: FAMILY MEDICINE

## 2021-04-01 PROCEDURE — G8419 CALC BMI OUT NRM PARAM NOF/U: HCPCS | Performed by: FAMILY MEDICINE

## 2021-04-01 PROCEDURE — 1036F TOBACCO NON-USER: CPT | Performed by: FAMILY MEDICINE

## 2021-04-01 PROCEDURE — 3017F COLORECTAL CA SCREEN DOC REV: CPT | Performed by: FAMILY MEDICINE

## 2021-04-01 ASSESSMENT — PATIENT HEALTH QUESTIONNAIRE - PHQ9
SUM OF ALL RESPONSES TO PHQ9 QUESTIONS 1 & 2: 0
1. LITTLE INTEREST OR PLEASURE IN DOING THINGS: 0

## 2021-04-01 NOTE — PROGRESS NOTES
Visit Information    Have you changed or started any medications since your last visit including any over-the-counter medicines, vitamins, or herbal medicines? no   Have you stopped taking any of your medications? Is so, why? -  no  Are you having any side effects from any of your medications? - no    Have you seen any other physician or provider since your last visit?  no   Have you had any other diagnostic tests since your last visit?  no   Have you been seen in the emergency room and/or had an admission in a hospital since we last saw you?  no   Have you had your routine dental cleaning in the past 6 months?  no     Do you have an active MyChart account? If no, what is the barrier?   Yes    Patient Care Team:  Bari Washington MD as PCP - General (Family Medicine)  Bari Washington MD as PCP - [facility]    Medical History Review  Past Medical, Family, and Social History reviewed and does not contribute to the patient presenting condition    Health Maintenance   Topic Date Due    Hepatitis C screen  Never done    COVID-19 Vaccine (1) Never done    DTaP/Tdap/Td vaccine (1 - Tdap) Never done    Shingles Vaccine (1 of 2) Never done    Colon cancer screen colonoscopy  Never done    Flu vaccine (Season Ended) 09/01/2021    Diabetes screen  07/22/2022    Lipid screen  03/08/2024    HIV screen  Completed    Hepatitis A vaccine  Aged Out    Hepatitis B vaccine  Aged Out    Hib vaccine  Aged Out    Meningococcal (ACWY) vaccine  Aged Out    Pneumococcal 0-64 years Vaccine  Aged Out

## 2021-05-03 ENCOUNTER — HOSPITAL ENCOUNTER (OUTPATIENT)
Age: 51
Discharge: HOME | End: 2021-05-05
Payer: MEDICAID

## 2021-05-03 ENCOUNTER — HOSPITAL ENCOUNTER (OUTPATIENT)
Dept: GENERAL RADIOLOGY | Age: 51
Discharge: HOME | End: 2021-05-05
Payer: MEDICAID

## 2021-05-03 DIAGNOSIS — M25.561 RIGHT KNEE PAIN, UNSPECIFIED CHRONICITY: ICD-10-CM

## 2021-05-03 PROCEDURE — 73560 X-RAY EXAM OF KNEE 1 OR 2: CPT

## 2021-06-08 ENCOUNTER — OFFICE VISIT (OUTPATIENT)
Dept: ORTHOPEDIC SURGERY | Age: 51
End: 2021-06-08
Payer: MEDICAID

## 2021-06-08 VITALS
WEIGHT: 176 LBS | DIASTOLIC BLOOD PRESSURE: 103 MMHG | SYSTOLIC BLOOD PRESSURE: 159 MMHG | HEART RATE: 83 BPM | HEIGHT: 69 IN | BODY MASS INDEX: 26.07 KG/M2

## 2021-06-08 DIAGNOSIS — M22.2X1 PATELLOFEMORAL SYNDROME OF RIGHT KNEE: Primary | ICD-10-CM

## 2021-06-08 PROCEDURE — G8427 DOCREV CUR MEDS BY ELIG CLIN: HCPCS | Performed by: FAMILY MEDICINE

## 2021-06-08 PROCEDURE — 1036F TOBACCO NON-USER: CPT | Performed by: FAMILY MEDICINE

## 2021-06-08 PROCEDURE — 3017F COLORECTAL CA SCREEN DOC REV: CPT | Performed by: FAMILY MEDICINE

## 2021-06-08 PROCEDURE — G8419 CALC BMI OUT NRM PARAM NOF/U: HCPCS | Performed by: FAMILY MEDICINE

## 2021-06-08 PROCEDURE — 99213 OFFICE O/P EST LOW 20 MIN: CPT | Performed by: FAMILY MEDICINE

## 2021-06-08 NOTE — PROGRESS NOTES
Sports Medicine Consultation     CHIEF COMPLAINT:  Knee Pain (Rt knee. no injury. worse over last year. feels weak at end of day.)      HPI:  Bladimir Shell is a 45y.o. year old male who is a  established patient being seen for regarding new problem right knee pain. The pain has been present for 1 year(s). The patient recalls a no specific injury. The patient has tried nothing without improvement. The pain is described as occ ache. There is not pain on weightbearing. The knee does not swell. There is is not painful popping and clicking. The knee does not catch or lock. It has not given out. It is  stiff upon arising from sitting. It is  painful to go up and down stairs and sit for a prolonged period of time. he has a past medical history of Allergic rhinitis. he has a past surgical history that includes Anterior cruciate ligament repair and orif femur decompression. family history includes Cancer in his mother; Heart Attack in his maternal grandfather; Heart Disease in his father; No Known Problems in his brother, maternal aunt, maternal grandmother, maternal uncle, paternal grandfather, paternal grandmother, and sister. Social History     Socioeconomic History    Marital status: Single     Spouse name: Not on file    Number of children: Not on file    Years of education: Not on file    Highest education level: Not on file   Occupational History    Not on file   Tobacco Use    Smoking status: Never Smoker    Smokeless tobacco: Never Used   Substance and Sexual Activity    Alcohol use:  Yes     Alcohol/week: 6.0 standard drinks     Types: 6 Cans of beer per week     Comment: 6 pack of beer a week    Drug use: Yes     Types: Marijuana    Sexual activity: Not on file   Other Topics Concern    Not on file   Social History Narrative    Not on file     Social Determinants of Health     Financial Resource Strain:     Difficulty of Paying Living Expenses:    Food Insecurity:  Worried About [facility] in the Last Year:    [address] in the Last Year:    Transportation Needs:     Lack of Transportation (Medical):  Lack of Transportation (Non-Medical):    Physical Activity:     Days of Exercise per Week:     Minutes of Exercise per Session:    Stress:     Feeling of Stress :    Social Connections:     Frequency of Communication with Friends and Family:     Frequency of Social Gatherings with Friends and Family:     Attends Jainism Services:     Active Member of Clubs or Organizations:     Attends Club or Organization Meetings:     Marital Status:    Intimate Partner Violence:     Fear of Current or Ex-Partner:     Emotionally Abused:     Physically Abused:     Sexually Abused:        No current outpatient medications on file. No current facility-administered medications for this visit. Allergies:  hehas No Known Allergies. ROS:  CV:  Denies chest pain; palpitations; shortness of breath; swelling of feet, ankles; and loss of consciousness. CON: Denies fever and dizziness. ENT:  Denies hearing loss / ringing, ear infections hoarseness, and swallowing problems. RESP:  Denies chronic cough, spitting up blood, and asthma/wheezing. GI: Denies abdominal pain, change in bowel habits, nausea or vomiting, and blood in stools. :  Denies frequent urination, burning or painful urination, blood in the urine, and bladder incontinence. NEURO:  Denies headache, memory loss, sleep disturbance, and tremor or movement disorder. PHYSICAL EXAM:   BP (!) 154/91 (Site: Left Upper Arm)   Pulse 81   Ht 5' 9\" (1.753 m)   Wt 176 lb (79.8 kg)   BMI 25.99 kg/m²   GENERAL: Luciano Patel is a 45 y.o. male who is alert and oriented and sitting comfortably in our office. SKIN:  Intact without rashes, lesions or ulcerations. NEURO: Sensation to the extremity is intact. VASC:  Capillary refill is less than 3 seconds. Distal pulses are palpable. There is no lymphadenopathy. Knee Exam  Musculoskeletal/Neurologic:  Inspection-Swelling: none, Ecchymosis: no  Palpation-Tenderness: pf comp  Pain with patellar grind: yes  ROM- 0-135  Strength- WNL  Sensation-normal to light touch    Special Tests-  Varus Laxity: negative   Valgus Laxity:  negative   Anterior Drawer: negative   Posterior Drawer: negative  Lachman's: negative  Brandy's:negative  Thessaly: negative    Single Leg Squat: Positive  Deep Squat: Positive  Gait: valgus thrust    PSYCH:  Good fund of knowledge and displays understanding of exam.    RADIOLOGY: No results found. IMPRESSION:     1. Patellofemoral syndrome of right knee          PLAN:   We discussed some of the etiologies and natural histories of     ICD-10-CM    1. Patellofemoral syndrome of right knee  M22.2X1    . We discussed the various treatment alternatives including anti-inflammatory medications, physical therapy, injections, further imaging studies and as a last resort surgery. This point I think his issue is patellofemoral in nature based on the way he works on that knee we will treat him with a course of home exercises if that fails to improve we will do formal physical therapy we will see him back based on his progression patient voiced understanding agreement this plan    Return to clinic in No follow-ups on file. Melina Mccullough Please be aware portions of this note were completed using voice recognition software and unforeseen errors may have occurred    Electronically signed by Anna Arboleda DO, FAOASM  on 6/8/21 at 11:23 AM EDT        No orders of the defined types were placed in this encounter.

## 2021-06-15 ENCOUNTER — TELEPHONE (OUTPATIENT)
Dept: FAMILY MEDICINE CLINIC | Age: 51
End: 2021-06-15

## 2021-06-18 ENCOUNTER — OFFICE VISIT (OUTPATIENT)
Dept: ORTHOPEDIC SURGERY | Age: 51
End: 2021-06-18
Payer: MEDICAID

## 2021-06-18 VITALS — SYSTOLIC BLOOD PRESSURE: 159 MMHG | DIASTOLIC BLOOD PRESSURE: 105 MMHG | HEART RATE: 76 BPM

## 2021-06-18 DIAGNOSIS — M19.172 POST-TRAUMATIC ARTHRITIS OF LEFT ANKLE: Primary | ICD-10-CM

## 2021-06-18 PROCEDURE — G8419 CALC BMI OUT NRM PARAM NOF/U: HCPCS | Performed by: FAMILY MEDICINE

## 2021-06-18 PROCEDURE — 3017F COLORECTAL CA SCREEN DOC REV: CPT | Performed by: FAMILY MEDICINE

## 2021-06-18 PROCEDURE — 99213 OFFICE O/P EST LOW 20 MIN: CPT | Performed by: FAMILY MEDICINE

## 2021-06-18 PROCEDURE — 20604 DRAIN/INJ JOINT/BURSA W/US: CPT | Performed by: FAMILY MEDICINE

## 2021-06-18 PROCEDURE — G8427 DOCREV CUR MEDS BY ELIG CLIN: HCPCS | Performed by: FAMILY MEDICINE

## 2021-06-18 PROCEDURE — 1036F TOBACCO NON-USER: CPT | Performed by: FAMILY MEDICINE

## 2021-06-18 RX ORDER — BUPIVACAINE HYDROCHLORIDE 5 MG/ML
1 INJECTION, SOLUTION PERINEURAL ONCE
Status: COMPLETED | OUTPATIENT
Start: 2021-06-18 | End: 2021-06-18

## 2021-06-18 RX ORDER — TRIAMCINOLONE ACETONIDE 40 MG/ML
40 INJECTION, SUSPENSION INTRA-ARTICULAR; INTRAMUSCULAR ONCE
Status: COMPLETED | OUTPATIENT
Start: 2021-06-18 | End: 2021-06-18

## 2021-06-18 RX ADMIN — BUPIVACAINE HYDROCHLORIDE 5 MG: 5 INJECTION, SOLUTION PERINEURAL at 12:17

## 2021-06-18 RX ADMIN — TRIAMCINOLONE ACETONIDE 40 MG: 40 INJECTION, SUSPENSION INTRA-ARTICULAR; INTRAMUSCULAR at 12:17

## 2021-06-18 NOTE — PROGRESS NOTES
Needs:     Lack of Transportation (Medical):  Lack of Transportation (Non-Medical):    Physical Activity:     Days of Exercise per Week:     Minutes of Exercise per Session:    Stress:     Feeling of Stress :    Social Connections:     Frequency of Communication with Friends and Family:     Frequency of Social Gatherings with Friends and Family:     Attends Muslim Services:     Active Member of Clubs or Organizations:     Attends Club or Organization Meetings:     Marital Status:    Intimate Partner Violence:     Fear of Current or Ex-Partner:     Emotionally Abused:     Physically Abused:     Sexually Abused:        No current outpatient medications on file. No current facility-administered medications for this visit. Allergies:  hehas No Known Allergies. ROS:  CV:  Denies chest pain; palpitations; shortness of breath; swelling of feet, ankles; and loss of consciousness. CON: Denies fever and dizziness. ENT:  Denies hearing loss / ringing, ear infections hoarseness, and swallowing problems. RESP:  Denies chronic cough, spitting up blood, and asthma/wheezing. GI: Denies abdominal pain, change in bowel habits, nausea or vomiting, and blood in stools. :  Denies frequent urination, burning or painful urination, blood in the urine, and bladder incontinence. NEURO:  Denies headache, memory loss, sleep disturbance, and tremor or movement disorder. 11 system review of systems is otherwise negative unless noted in HPI    PHYSICAL EXAM:   BP (!) 145/99 (Site: Left Upper Arm)   Pulse 89   GENERAL: Luciano Patel is a 45 y.o. male who is alert and oriented and sitting comfortably in our office. SKIN:  Intact without rashes, lesions or ulcerations. No obvious deformity or swelling. NEURO: Musculoskeletal and axillary nerves intact to sensory and motor testing. EYES:  Extraocular muscles intact. MOUTH: Oral mucosa moist.  No perioral lesions.   PULM:  Respirations unlabored and regular. VASC:  Capillary refill less than 3 seconds. Distal pulses are palpable. There is no lymphadenopathy. Ankle Exam:    Reveals there is not effusion. Swelling is not present. Edema is not present. Ecchymoses is not present. Palpation-Tenderness lateral ankle joint  The foot is in normal alignment. ROM:  20 degrees plantarflexion and 20 degrees dorsiflexion. Subtalar motion is 30 degrees inversion and 20 degrees eversion. Strength-WNL  Sensation-normal to light touch  Special Tests-Ankle inversion: laxity negative  Ankle eversion: laxity negative  Ankle drawer: laxity negative  External rotation:negative  Syndesmotic Squeeze test: negative    Gait: Antalgic    PSYCH:  Patient has good fund of knowledge and displays understanding of exam.    RADIOLOGY: No results found. IMPRESSION:     1. Post-traumatic arthritis of left ankle        PLAN:   We discussed some of the etiologies and natural histories of     ICD-10-CM    1. Post-traumatic arthritis of left ankle  M19.172     We discussed the various treatment alternatives including anti-inflammatory medications, physical therapy, injections, further imaging studies and as a last resort surgery. This point patient has done well with tibiotalar injections I do think another one is appropriate was administered in the manner as typed in chart. Patient tolerated procedure well. We will have him follow-up with us based on his progression he may get injection every 3 to 4 months he voiced understanding and agreement this plan    No follow-ups on file. Please be aware portions of this note were completed using voice recognition software and unforeseen errors may have occurred    Electronically signed by Whitley Li DO, FAOASM  on 6/18/21 at 9:51 AM EDT    No orders of the defined types were placed in this encounter.     After the risks, benefits, alternatives and procedure of a  left ultrasound guided lateral tibiotalar joint injection were discussed. Consent was obtained and a time out was performed. Structures of the lateral tibiotalar joint were visualized under ultrasound with image capture to be uploaded into the electronic medical record and notation of vascular structures to be avoided. Area was prepped in a sterile manner with Betadine. The skin was then cooled with cold spray and re-cleaned with alcohol prep. Then under ultrasound guidance I injected 1 mL of 40 mg of kenalog and 1 mL of 0.5% Marcaine into the lateral tibiotalar joint with image capture of the injection to be uploaded into the electronic medical record. Patient tolerated the procedure well.

## 2023-09-24 NOTE — TELEPHONE ENCOUNTER
Pt is altagracia with Dr. Adrian Jackson 4-1-21. For follow up on HTN, has some knee soreness, offered sooner appt with care team, he stated has for many years, okay with just waiting for this appt.
show